# Patient Record
Sex: FEMALE | Race: WHITE | NOT HISPANIC OR LATINO | Employment: OTHER | URBAN - METROPOLITAN AREA
[De-identification: names, ages, dates, MRNs, and addresses within clinical notes are randomized per-mention and may not be internally consistent; named-entity substitution may affect disease eponyms.]

---

## 2024-01-26 ENCOUNTER — OFFICE VISIT (OUTPATIENT)
Dept: ORTHOPEDIC SURGERY | Facility: CLINIC | Age: 69
End: 2024-01-26
Payer: MEDICARE

## 2024-01-26 VITALS
BODY MASS INDEX: 35.41 KG/M2 | WEIGHT: 225.6 LBS | HEIGHT: 67 IN | DIASTOLIC BLOOD PRESSURE: 80 MMHG | SYSTOLIC BLOOD PRESSURE: 140 MMHG

## 2024-01-26 DIAGNOSIS — M17.12 PRIMARY OSTEOARTHRITIS OF LEFT KNEE: ICD-10-CM

## 2024-01-26 DIAGNOSIS — M25.562 LEFT KNEE PAIN, UNSPECIFIED CHRONICITY: Primary | ICD-10-CM

## 2024-01-26 RX ORDER — CYCLOBENZAPRINE HCL 5 MG
TABLET ORAL
COMMUNITY

## 2024-01-26 RX ORDER — OMEPRAZOLE 40 MG/1
CAPSULE, DELAYED RELEASE ORAL
COMMUNITY
Start: 2023-11-28

## 2024-01-26 RX ORDER — FLUTICASONE PROPIONATE AND SALMETEROL XINAFOATE 115; 21 UG/1; UG/1
AEROSOL, METERED RESPIRATORY (INHALATION)
COMMUNITY
Start: 2023-10-07

## 2024-01-26 RX ORDER — CLONAZEPAM 1 MG/1
TABLET ORAL
COMMUNITY
Start: 2023-11-18

## 2024-01-26 RX ORDER — MONTELUKAST SODIUM 10 MG/1
TABLET ORAL
COMMUNITY
Start: 2023-12-08

## 2024-01-26 RX ORDER — TIOTROPIUM BROMIDE INHALATION SPRAY 1.56 UG/1
SPRAY, METERED RESPIRATORY (INHALATION)
COMMUNITY
Start: 2023-11-13

## 2024-01-26 RX ORDER — PREGABALIN 50 MG/1
CAPSULE ORAL
COMMUNITY
Start: 2023-12-15

## 2024-01-26 RX ORDER — DILTIAZEM HYDROCHLORIDE 240 MG/1
CAPSULE, COATED, EXTENDED RELEASE ORAL
COMMUNITY
Start: 2024-01-22

## 2024-01-26 RX ORDER — BUPIVACAINE HYDROCHLORIDE 5 MG/ML
4 INJECTION, SOLUTION EPIDURAL; INTRACAUDAL
Status: COMPLETED | OUTPATIENT
Start: 2024-01-26 | End: 2024-01-26

## 2024-01-26 RX ORDER — METFORMIN HYDROCHLORIDE 500 MG/1
TABLET, EXTENDED RELEASE ORAL
COMMUNITY
Start: 2024-01-15

## 2024-01-26 RX ORDER — ESTRADIOL 0.1 MG/G
CREAM VAGINAL
COMMUNITY

## 2024-01-26 RX ORDER — TRIAMCINOLONE ACETONIDE 40 MG/ML
40 INJECTION, SUSPENSION INTRA-ARTICULAR; INTRAMUSCULAR
Status: COMPLETED | OUTPATIENT
Start: 2024-01-26 | End: 2024-01-26

## 2024-01-26 RX ORDER — FLUTICASONE PROPIONATE 50 MCG
2 SPRAY, SUSPENSION (ML) NASAL DAILY
COMMUNITY

## 2024-01-26 RX ORDER — LIDOCAINE HYDROCHLORIDE 10 MG/ML
4 INJECTION, SOLUTION EPIDURAL; INFILTRATION; INTRACAUDAL; PERINEURAL
Status: COMPLETED | OUTPATIENT
Start: 2024-01-26 | End: 2024-01-26

## 2024-01-26 RX ORDER — LOSARTAN POTASSIUM 100 MG/1
TABLET ORAL
COMMUNITY
Start: 2024-01-17

## 2024-01-26 RX ORDER — EPINEPHRINE 0.3 MG/.3ML
INJECTION SUBCUTANEOUS
COMMUNITY

## 2024-01-26 RX ORDER — ALBUTEROL SULFATE 90 UG/1
AEROSOL, METERED RESPIRATORY (INHALATION)
COMMUNITY

## 2024-01-26 RX ORDER — DUPILUMAB 300 MG/2ML
INJECTION, SOLUTION SUBCUTANEOUS
COMMUNITY
Start: 2024-01-01

## 2024-01-26 RX ORDER — GABAPENTIN 600 MG/1
TABLET ORAL
COMMUNITY
Start: 2023-11-01

## 2024-01-26 RX ORDER — CLONAZEPAM 1 MG/1
TABLET ORAL AS NEEDED
COMMUNITY
Start: 2024-01-24

## 2024-01-26 RX ORDER — LANOLIN ALCOHOL/MO/W.PET/CERES
325 CREAM (GRAM) TOPICAL
COMMUNITY
Start: 2023-12-18

## 2024-01-26 RX ADMIN — TRIAMCINOLONE ACETONIDE 40 MG: 40 INJECTION, SUSPENSION INTRA-ARTICULAR; INTRAMUSCULAR at 10:20

## 2024-01-26 RX ADMIN — LIDOCAINE HYDROCHLORIDE 4 ML: 10 INJECTION, SOLUTION EPIDURAL; INFILTRATION; INTRACAUDAL; PERINEURAL at 10:20

## 2024-01-26 RX ADMIN — BUPIVACAINE HYDROCHLORIDE 4 ML: 5 INJECTION, SOLUTION EPIDURAL; INTRACAUDAL at 10:20

## 2024-01-26 NOTE — PROGRESS NOTES
Procedure   - Large Joint Arthrocentesis: L knee on 1/26/2024 10:20 AM  Indications: pain  Details: 21 G needle, anterolateral approach  Medications: 40 mg triamcinolone acetonide 40 MG/ML; 4 mL lidocaine PF 1% 1 %; 4 mL bupivacaine (PF) 0.5 %  Outcome: tolerated well, no immediate complications  Procedure, treatment alternatives, risks and benefits explained, specific risks discussed. Consent was given by the patient. Immediately prior to procedure a time out was called to verify the correct patient, procedure, equipment, support staff and site/side marked as required. Patient was prepped and draped in the usual sterile fashion.

## 2024-01-26 NOTE — PROGRESS NOTES
[facility] Note        Subjective     Pain of the Left Knee      HPI    Yanira Bernal is a 68 y.o. female.  She has a history of getting previous cortisone injections in her left knee.  She had surgery in 2019 in [place].  She has had 2 right knee surgeries and the one left knee arthroscopy.  She had a partial medial meniscectomy she brings notes from [facility] in [place]    Past Medical History:   Diagnosis Date    Ankle sprain     Arthritis of back     Knee swelling     Lumbosacral disc disease     Rotator cuff syndrome     Tear of meniscus of knee       Past Surgical History:   Procedure Laterality Date    FOOT SURGERY  R baby toe- Hammer toe    KNEE SURGERY  R knee &. L knee 2018    SHOULDER SURGERY  &&2017    TRIGGER POINT INJECTION  L middle finger    Surgery schedueld o 26      Family History   Problem Relation Age of Onset    Cancer Mother         Lung cancer   Age 59    Cancer Father         Eosophigeal cancer  age 66    Diabetes Father         Type 2    Cancer Sister         T cell Lymphoma    Diabetes Brother         Type 2     Social History     Socioeconomic History    Marital status:    Tobacco Use    Smoking status: Former     Packs/day: 1.50     Years: 10.00     Additional pack years: 0.00     Total pack years: 15.00     Types: Cigarettes     Start date: 1973     Quit date: 1983     Years since quittin.0   Substance and Sexual Activity    Alcohol use: Yes     Alcohol/week: 5.0 standard drinks of alcohol     Types: 5 Drinks containing 0.5 oz of alcohol per week    Drug use: Never      Current Outpatient Medications on File Prior to Visit   Medication Sig Dispense Refill    Advair -21 MCG/ACT inhaler       albuterol sulfate HFA (ProAir HFA) 108 (90 Base) MCG/ACT inhaler       clonazePAM (KlonoPIN) 1 MG tablet As Needed.      clonazePAM (KlonoPIN) 1 MG tablet clonazePAM 1 MG Oral Tablet QTY:  30  Days: 30 Refills: 0  Written: 11/18/23 Patient Instructions:      cyclobenzaprine (FLEXERIL) 5 MG tablet Take 1 tablet by oral route at bedtime for 30 days.      dilTIAZem CD (CARDIZEM CD) 240 MG 24 hr capsule       Dupixent 300 MG/2ML solution pen-injector       EPINEPHrine (EPIPEN) 0.3 MG/0.3ML solution auto-injector injection       estradiol (ESTRACE) 0.1 MG/GM vaginal cream       ferrous sulfate 325 (65 FE) MG EC tablet Take 1 tablet by mouth.      fluticasone (FLONASE) 50 MCG/ACT nasal spray 2 sprays into the nostril(s) as directed by provider Daily.      gabapentin (NEURONTIN) 600 MG tablet       losartan (COZAAR) 100 MG tablet       metFORMIN ER (GLUCOPHAGE-XR) 500 MG 24 hr tablet       montelukast (SINGULAIR) 10 MG tablet       omeprazole (priLOSEC) 40 MG capsule       pregabalin (LYRICA) 50 MG capsule       Spiriva Respimat 1.25 MCG/ACT aerosol solution inhaler       Tiotropium Bromide Monohydrate (SPIRIVA RESPIMAT) 1.25 MCG/ACT aerosol solution inhaler        No current facility-administered medications on file prior to visit.      Allergies   Allergen Reactions    Amlodipine Swelling    Adhesive Tape Rash    Dulaglutide Other (See Comments)     Nausea    Other Other (See Comments)     Hydrochlorothiazide  elevated patient  calcium to where she was in the hospital for five days          Review of Systems   Constitutional: Negative.    HENT: Negative.     Eyes: Negative.    Respiratory: Negative.     Cardiovascular: Negative.    Gastrointestinal: Negative.    Endocrine: Negative.    Genitourinary: Negative.    Musculoskeletal:  Positive for arthralgias.   Skin: Negative.    Allergic/Immunologic: Negative.    Neurological: Negative.    Hematological: Negative.    Psychiatric/Behavioral: Negative.          I reviewed the patient's chief complaint, history of present illness, review of systems, past medical history, surgical history, family history, social history, medications and allergy list.       "  Objective      Physical Exam  /80   Ht 170.2 cm (67\")   Wt 102 kg (225 lb 9.6 oz)   BMI 35.33 kg/m²     Body mass index is 35.33 kg/m².    General  Mental Status - alert  General Appearance - cooperative, well groomed, not in acute distress  Orientation - Oriented X3  Build & Nutrition - well developed and well nourished  Posture - normal posture  Gait - normal gait       Ortho Exam  Left knee is tender medial joint line positive Ennis.  Stable ligaments.  Range of motion 5 to 120 degrees.    Imaging/Studies  Imaging Results (Last 24 Hours)       Procedure Component Value Units Date/Time    XR Knee 4+ View Left [[id number]] Resulted: 01/26/24 1019     Updated: 01/26/24 1019    Narrative:      Knee X-Ray  Indication: Pain    Upright AP of bilateral knees. Lateral, skiers and Sunrise views of left   knee     Findings:  No fracture  Kellgren-Cristhian grade 3 left knee less joint space narrowing in the   right knee    No prior studies were available for comparison.              Assessment    Assessment:  1. Left knee pain, unspecified chronicity    2. Primary osteoarthritis of left knee        Plan:  Continue over-the-counter medication as needed for discomfort  I injected her left knee with cortisone.   I discussed with the patient the potential benefits of performing a therapeutic injection of the cortisone as well as potential risks including but not limited to infection, swelling, pain, bleeding, bruising, nerve/vessel damage, skin color changes, transient elevation in blood glucose levels, and fat atrophy. After informed consent and verifying correct patient, procedure site, and type of procedure, the area was prepped with Hibiclens, ethyl chloride was used to numb the skin. The patient tolerated the procedure well. There were no complications.  She will follow-up as needed        Carlos Crisostomo MD  01/26/24  10:20 EST      Dictated Utilizing Dragon Dictation.    "

## 2024-03-13 ENCOUNTER — OFFICE VISIT (OUTPATIENT)
Dept: NEUROSURGERY | Facility: CLINIC | Age: 69
End: 2024-03-13
Payer: MEDICARE

## 2024-03-13 VITALS — BODY MASS INDEX: 36.1 KG/M2 | HEIGHT: 67 IN | WEIGHT: 230 LBS | TEMPERATURE: 98 F | RESPIRATION RATE: 18 BRPM

## 2024-03-13 DIAGNOSIS — G89.29 CHRONIC BILATERAL LOW BACK PAIN WITHOUT SCIATICA: ICD-10-CM

## 2024-03-13 DIAGNOSIS — M47.819 FACET ARTHROPATHY, MULTILEVEL: ICD-10-CM

## 2024-03-13 DIAGNOSIS — M54.50 CHRONIC BILATERAL LOW BACK PAIN WITHOUT SCIATICA: ICD-10-CM

## 2024-03-13 DIAGNOSIS — M48.061 DEGENERATIVE LUMBAR SPINAL STENOSIS: Primary | ICD-10-CM

## 2024-03-13 DIAGNOSIS — M48.062 SPINAL STENOSIS, LUMBAR REGION, WITH NEUROGENIC CLAUDICATION: ICD-10-CM

## 2024-03-13 PROCEDURE — 99203 OFFICE O/P NEW LOW 30 MIN: CPT | Performed by: NEUROLOGICAL SURGERY

## 2024-03-13 NOTE — PROGRESS NOTES
Patient: Yanira Bernal  : 1955    Primary Care Provider: Leticia Menchaca MD    Requesting Provider: As above        History    Chief Complaint: Low back and lower extremity pain with walking and standing intolerance.    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  She has had intermittent pain in her back.  She has developed progressive pain in her back that extends into her legs.  Symptoms occur with standing or walking for more than 20 minutes.  She gets relief by sitting, lying down, or leaning forward at the waist.  She has undergone 2 epidural injections and facet blocks at the [facility].  These have not helped.  She has been treated with gabapentin.  She has done physical therapy.  She does have tingling in her legs that sometimes will awaken her at night.  I have recently operated on her  and he is doing well.  She does have a significant right Achilles tendinopathy.  She has been seen by neurosurgery at the [facility] but at this point would like me to care for her.    Review of Systems   Constitutional:  Negative for activity change, appetite change, chills, diaphoresis, fatigue, fever and unexpected weight change.   HENT:  Negative for congestion, dental problem, drooling, ear discharge, ear pain, facial swelling, hearing loss, mouth sores, nosebleeds, postnasal drip, rhinorrhea, sinus pressure, sneezing, sore throat, tinnitus, trouble swallowing and voice change.    Eyes:  Negative for photophobia, pain, discharge, redness, itching and visual disturbance.   Respiratory:  Negative for apnea, cough, choking, chest tightness, shortness of breath, wheezing and stridor.    Cardiovascular:  Negative for chest pain, palpitations and leg swelling.   Gastrointestinal:  Negative for abdominal distention, abdominal pain, anal bleeding, blood in stool, constipation, diarrhea, nausea, rectal pain and vomiting.   Endocrine:  "Negative for cold intolerance, heat intolerance, polydipsia, polyphagia and polyuria.   Genitourinary:  Negative for decreased urine volume, difficulty urinating, dysuria, enuresis, flank pain, frequency, genital sores, hematuria and urgency.   Musculoskeletal:  Positive for arthralgias and back pain. Negative for gait problem, joint swelling, myalgias, neck pain and neck stiffness.   Skin:  Negative for color change, pallor, rash and wound.   Allergic/Immunologic: Negative for environmental allergies, food allergies and immunocompromised state.   Neurological:  Negative for dizziness, tremors, seizures, syncope, facial asymmetry, speech difficulty, weakness, light-headedness, numbness and headaches.   Hematological:  Negative for adenopathy. Does not bruise/bleed easily.   Psychiatric/Behavioral:  Negative for agitation, behavioral problems, confusion, decreased concentration, dysphoric mood, hallucinations, self-injury, sleep disturbance and suicidal ideas. The patient is not nervous/anxious and is not hyperactive.    All other systems reviewed and are negative.      The patient's past medical history, past surgical history, family history, and social history have been reviewed at length in the electronic medical record.      Physical Exam:   Temp 98 °F (36.7 °C) (Infrared)   Resp 18   Ht 170.2 cm (67\")   Wt 104 kg (230 lb)   BMI 36.02 kg/m²   CONSTITUTIONAL: Patient is well-nourished, pleasant and appears stated age.  MUSCULOSKELETAL:  Straight leg raising is negative.  Aramis's Sign is negative.  ROM in the low back is normal.  Tenderness in the back to palpation is not observed.  NEUROLOGICAL:  Orientation, memory, attention span, language function, and cognition have been examined and are intact.  Strength is intact in the lower extremities to direct testing.  Muscle tone is normal throughout.  Station and gait are normal.  Sensation is intact to light touch testing throughout.  Deep tendon reflexes are " difficult to elicit throughout.  Coordination is intact.      Medical Decision Making    Data Review:   (All imaging studies were personally reviewed unless stated otherwise)  MRI of the lumbar spine dated 2/15/2024 demonstrates diffuse degenerative disc disease and facet arthropathy.  There may be a very subtle offset of L4 on L5.  There is generous grade stenosis at L4-5.    Diagnosis:   Lumbar stenosis with neurogenic claudication.    Treatment Options:   For completeness, I am going to check flexion-extension upright lateral lumbar spine x-rays to assess for instability.  I think she will likely benefit from a decompression.      Scribed for Dheeraj Wellington MD by Kar Cueva CMA. 3/13/2024 15:06 EDT      I, Dr. Wellington, personally performed the services described in the documentation, as scribed in my presence, and it is both accurate and complete.

## 2024-03-15 ENCOUNTER — HOSPITAL ENCOUNTER (OUTPATIENT)
Dept: GENERAL RADIOLOGY | Facility: HOSPITAL | Age: 69
Discharge: HOME | End: 2024-03-15
Payer: MEDICARE

## 2024-03-15 DIAGNOSIS — M48.062 SPINAL STENOSIS, LUMBAR REGION, WITH NEUROGENIC CLAUDICATION: ICD-10-CM

## 2024-03-15 PROCEDURE — 72120 X-RAY BEND ONLY L-S SPINE: CPT

## 2024-03-20 ENCOUNTER — PREP FOR SURGERY (OUTPATIENT)
Dept: OTHER | Facility: HOSPITAL | Age: 69
End: 2024-03-20
Payer: MEDICARE

## 2024-03-20 ENCOUNTER — OFFICE VISIT (OUTPATIENT)
Dept: NEUROSURGERY | Facility: CLINIC | Age: 69
End: 2024-03-20
Payer: MEDICARE

## 2024-03-20 VITALS — BODY MASS INDEX: 35.47 KG/M2 | HEIGHT: 67 IN | TEMPERATURE: 97.3 F | WEIGHT: 226 LBS

## 2024-03-20 DIAGNOSIS — G89.29 CHRONIC BILATERAL LOW BACK PAIN WITHOUT SCIATICA: ICD-10-CM

## 2024-03-20 DIAGNOSIS — M48.061 DEGENERATIVE LUMBAR SPINAL STENOSIS: Primary | ICD-10-CM

## 2024-03-20 DIAGNOSIS — M54.50 CHRONIC BILATERAL LOW BACK PAIN WITHOUT SCIATICA: ICD-10-CM

## 2024-03-20 DIAGNOSIS — M48.062 LUMBAR STENOSIS WITH NEUROGENIC CLAUDICATION: Primary | ICD-10-CM

## 2024-03-20 DIAGNOSIS — M47.819 FACET ARTHROPATHY, MULTILEVEL: ICD-10-CM

## 2024-03-20 DIAGNOSIS — M48.062 SPINAL STENOSIS, LUMBAR REGION, WITH NEUROGENIC CLAUDICATION: ICD-10-CM

## 2024-03-20 PROCEDURE — 1159F MED LIST DOCD IN RCRD: CPT | Performed by: NEUROLOGICAL SURGERY

## 2024-03-20 PROCEDURE — 99214 OFFICE O/P EST MOD 30 MIN: CPT | Performed by: NEUROLOGICAL SURGERY

## 2024-03-20 PROCEDURE — 1160F RVW MEDS BY RX/DR IN RCRD: CPT | Performed by: NEUROLOGICAL SURGERY

## 2024-03-20 RX ORDER — FAMOTIDINE 20 MG/1
20 TABLET, FILM COATED ORAL
OUTPATIENT
Start: 2024-03-20

## 2024-03-20 RX ORDER — IBUPROFEN 800 MG/1
800 TABLET ORAL ONCE
OUTPATIENT
Start: 2024-03-20 | End: 2024-03-20

## 2024-03-20 RX ORDER — OXYCODONE HCL 10 MG/1
10 TABLET, FILM COATED, EXTENDED RELEASE ORAL ONCE
OUTPATIENT
Start: 2024-03-20 | End: 2024-03-20

## 2024-03-20 RX ORDER — ACETAMINOPHEN 500 MG
1000 TABLET ORAL ONCE
OUTPATIENT
Start: 2024-03-20 | End: 2024-03-20

## 2024-03-20 RX ORDER — CHLORHEXIDINE GLUCONATE 4 G/100ML
SOLUTION TOPICAL
Qty: 120 ML | Refills: 0 | Status: SHIPPED | OUTPATIENT
Start: 2024-03-20

## 2024-03-20 NOTE — PROGRESS NOTES
Patient: Yanira Bernal  : 1955    Primary Care Provider: Leticia Menchaca MD    Requesting Provider: As above        History    Chief Complaint: Low back and lower extremity pain with walking and standing intolerance.    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  She has had intermittent pain in her back.  She has developed progressive pain in her back that extends into her legs.  Symptoms occur with standing or walking for more than 20 minutes.  She gets relief by sitting, lying down, or leaning forward at the waist.  She has undergone 2 epidural injections and facet blocks at the [facility].  These have not helped.  She has been treated with gabapentin.  She has done physical therapy.  She does have tingling in her legs that sometimes will awaken her at night.  I have recently operated on her  and he is doing well.  She does have a significant right Achilles tendinopathy.  She has been seen by neurosurgery at the [facility] but at this point would like me to care for her.  She reports no changes in her symptoms.       Review of Systems   Constitutional:  Positive for activity change and fatigue. Negative for appetite change, chills, diaphoresis, fever and unexpected weight change.   HENT:  Positive for hearing loss and tinnitus. Negative for congestion, dental problem, drooling, ear discharge, ear pain, facial swelling, mouth sores, nosebleeds, postnasal drip, rhinorrhea, sinus pressure, sinus pain, sneezing, sore throat, trouble swallowing and voice change.    Eyes:  Negative for photophobia, pain, discharge, redness, itching and visual disturbance.   Respiratory:  Positive for apnea. Negative for cough, choking, chest tightness, shortness of breath, wheezing and stridor.    Cardiovascular:  Negative for chest pain, palpitations and leg swelling.   Gastrointestinal:  Negative for abdominal distention, abdominal pain,  "anal bleeding, blood in stool, constipation, diarrhea, nausea, rectal pain and vomiting.   Endocrine: Negative for cold intolerance, heat intolerance, polydipsia, polyphagia and polyuria.   Genitourinary:  Negative for decreased urine volume, difficulty urinating, dyspareunia, dysuria, enuresis, flank pain, frequency, genital sores, hematuria, menstrual problem, pelvic pain, urgency, vaginal bleeding, vaginal discharge and vaginal pain.   Musculoskeletal:  Positive for arthralgias and back pain. Negative for gait problem, joint swelling, myalgias, neck pain and neck stiffness.   Skin:  Negative for color change, pallor, rash and wound.   Allergic/Immunologic: Negative for environmental allergies, food allergies and immunocompromised state.   Neurological:  Negative for dizziness, tremors, seizures, syncope, facial asymmetry, speech difficulty, weakness, light-headedness, numbness and headaches.   Hematological:  Negative for adenopathy. Does not bruise/bleed easily.   Psychiatric/Behavioral:  Positive for sleep disturbance. Negative for agitation, behavioral problems, confusion, decreased concentration, dysphoric mood, hallucinations, self-injury and suicidal ideas. The patient is nervous/anxious. The patient is not hyperactive.      The patient's past medical history, past surgical history, family history, and social history have been reviewed at length in the electronic medical record.      Physical Exam:   Temp 97.3 °F (36.3 °C)   Ht 170.2 cm (67.01\")   Wt 103 kg (226 lb)   BMI 35.39 kg/m²   MUSCULOSKELETAL:  Straight leg raising is negative.  Aramis's Sign is negative.  ROM in the low back is normal.  Tenderness in the back to palpation is not observed.  NEUROLOGICAL:  Strength is intact in the lower extremities to direct testing.  Muscle tone is normal throughout.  Station and gait are normal.  Sensation is intact to light touch testing throughout.      Medical Decision Making    Data Review:   (All imaging " studies were personally reviewed unless stated otherwise)  MRI of the lumbar spine dated 2/15/2024 demonstrates diffuse degenerative disc disease and facet arthropathy. There may be a very subtle offset of L4 on L5. There is generous grade stenosis at L4-5.     Plain flexion-extension films do not demonstrate any evidence of instability.  There is a very subtle listhesis of L4 and L5.    Diagnosis:   Lumbar stenosis with neurogenic claudication.    Treatment Options:   I have recommended decompressive laminectomy at L4-5.  The nature of the procedure as well as the potential risks, complications, limitations, and alternatives to the procedure were discussed at length with the patient and the patient has agreed to proceed with surgery.  The patient reports that she was given Norco after her shoulder procedure and that was not helpful.  She reports that she has a very low pain threshold.      Scribed for Dheeraj Wellington MD by Briseida Johansen MA on 3/20/2024 14:22 EDT       I, Dr. Wellington, personally performed the services described in the documentation, as scribed in my presence, and it is both accurate and complete.

## 2024-03-20 NOTE — H&P
Patient: Yanira Bernal  : 1955     Primary Care Provider: Leticia Menchaca MD     Requesting Provider: As above           History     Chief Complaint: Low back and lower extremity pain with walking and standing intolerance.     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  She has had intermittent pain in her back.  She has developed progressive pain in her back that extends into her legs.  Symptoms occur with standing or walking for more than 20 minutes.  She gets relief by sitting, lying down, or leaning forward at the waist.  She has undergone 2 epidural injections and facet blocks at the [facility].  These have not helped.  She has been treated with gabapentin.  She has done physical therapy.  She does have tingling in her legs that sometimes will awaken her at night.  I have recently operated on her  and he is doing well.  She does have a significant right Achilles tendinopathy.  She has been seen by neurosurgery at the [facility] but at this point would like me to care for her.  She reports no changes in her symptoms.        Review of Systems   Constitutional:  Positive for activity change and fatigue. Negative for appetite change, chills, diaphoresis, fever and unexpected weight change.   HENT:  Positive for hearing loss and tinnitus. Negative for congestion, dental problem, drooling, ear discharge, ear pain, facial swelling, mouth sores, nosebleeds, postnasal drip, rhinorrhea, sinus pressure, sinus pain, sneezing, sore throat, trouble swallowing and voice change.    Eyes:  Negative for photophobia, pain, discharge, redness, itching and visual disturbance.   Respiratory:  Positive for apnea. Negative for cough, choking, chest tightness, shortness of breath, wheezing and stridor.    Cardiovascular:  Negative for chest pain, palpitations and leg swelling.   Gastrointestinal:  Negative for abdominal distention,  abdominal pain, anal bleeding, blood in stool, constipation, diarrhea, nausea, rectal pain and vomiting.   Endocrine: Negative for cold intolerance, heat intolerance, polydipsia, polyphagia and polyuria.   Genitourinary:  Negative for decreased urine volume, difficulty urinating, dyspareunia, dysuria, enuresis, flank pain, frequency, genital sores, hematuria, menstrual problem, pelvic pain, urgency, vaginal bleeding, vaginal discharge and vaginal pain.   Musculoskeletal:  Positive for arthralgias and back pain. Negative for gait problem, joint swelling, myalgias, neck pain and neck stiffness.   Skin:  Negative for color change, pallor, rash and wound.   Allergic/Immunologic: Negative for environmental allergies, food allergies and immunocompromised state.   Neurological:  Negative for dizziness, tremors, seizures, syncope, facial asymmetry, speech difficulty, weakness, light-headedness, numbness and headaches.   Hematological:  Negative for adenopathy. Does not bruise/bleed easily.   Psychiatric/Behavioral:  Positive for sleep disturbance. Negative for agitation, behavioral problems, confusion, decreased concentration, dysphoric mood, hallucinations, self-injury and suicidal ideas. The patient is nervous/anxious. The patient is not hyperactive.       The patient's past medical history, past surgical history, family history, and social history have been reviewed at length in the electronic medical record.     Past Medical History:   Diagnosis Date    Abnormal ECG 2012    R side bundle branch block    Ankle sprain 1999    Arthritis of back     Asthma 2012    Cancer 2005    Squarmous cell skin cancer    Diabetes mellitus 2020    Hyperlipidemia 2010    Hypertension 2002    Knee swelling     Liver disease 2020    [facility]    Low back pain 1979    Lumbosacral disc disease 1979    MRSA (methicillin resistant Staphylococcus aureus) 2018    Peripheral neuropathy 1991    Rotator cuff syndrome 2015    Tear of meniscus of knee 2018      Past Surgical History:   Procedure Laterality Date    EPIDURAL BLOCK  [id number]    Childbirth    FOOT SURGERY  R baby toe- Hammer toe    KNEE SURGERY  R knee &. L knee 2018    SHOULDER SURGERY  &&2017    TRIGGER POINT INJECTION  L middle finger    Surgery schedueld o 24     Family History   Problem Relation Age of Onset    Cancer Mother         Lung cancer   Age 59    Early death Mother     Thyroid disease Mother     Cancer Father         Eosophigeal cancer  age 66    Diabetes Father         Type 2    Alcohol abuse Father     Hyperlipidemia Father     Cancer Sister         T cell Lymphoma    Alcohol abuse Sister     Depression Sister     Diabetes Brother         Type 2    Asthma Brother      Social History     Socioeconomic History    Marital status:    Tobacco Use    Smoking status: Former     Current packs/day: 0.00     Average packs/day: 1.5 packs/day for 10.0 years (15.0 ttl pk-yrs)     Types: Cigarettes     Start date: 1973     Quit date: 1983     Years since quittin.1   Substance and Sexual Activity    Alcohol use: Yes     Alcohol/week: 8.0 standard drinks of alcohol     Types: 3 Glasses of wine, 5 Drinks containing 0.5 oz of alcohol per week    Drug use: Never    Sexual activity: Not Currently     Partners: Male     Birth control/protection: Post-menopausal           Allergies   Allergen Reactions    Amlodipine Swelling    Adhesive Tape Rash    Dulaglutide Other (See Comments)     Nausea    Other Other (See Comments)     Hydrochlorothiazide  elevated patient  calcium to where she was in the hospital for five days       Current Outpatient Medications on File Prior to Visit   Medication Sig Dispense Refill    Advair -21 MCG/ACT inhaler       albuterol sulfate HFA (ProAir HFA) 108 (90 Base) MCG/ACT inhaler       clonazePAM (KlonoPIN) 1 MG tablet As Needed.      clonazePAM (KlonoPIN) 1 MG tablet clonazePAM 1 MG Oral Tablet QTY: 30  Days: 30 Refills: 0  Written:  "11/18/23 Patient Instructions:      cyclobenzaprine (FLEXERIL) 5 MG tablet Take 1 tablet by oral route at bedtime for 30 days.      dilTIAZem CD (CARDIZEM CD) 240 MG 24 hr capsule       Dupixent 300 MG/2ML solution pen-injector       EPINEPHrine (EPIPEN) 0.3 MG/0.3ML solution auto-injector injection       estradiol (ESTRACE) 0.1 MG/GM vaginal cream       ferrous sulfate 325 (65 FE) MG EC tablet Take 1 tablet by mouth.      fluticasone (FLONASE) 50 MCG/ACT nasal spray 2 sprays into the nostril(s) as directed by provider Daily.      gabapentin (NEURONTIN) 600 MG tablet       losartan (COZAAR) 100 MG tablet       metFORMIN ER (GLUCOPHAGE-XR) 500 MG 24 hr tablet       montelukast (SINGULAIR) 10 MG tablet       omeprazole (priLOSEC) 40 MG capsule       Spiriva Respimat 1.25 MCG/ACT aerosol solution inhaler       Tiotropium Bromide Monohydrate (SPIRIVA RESPIMAT) 1.25 MCG/ACT aerosol solution inhaler        No current facility-administered medications on file prior to visit.          Physical Exam:   Temp 97.3 °F (36.3 °C)   Ht 170.2 cm (67.01\")   Wt 103 kg (226 lb)   BMI 35.39 kg/m²   MUSCULOSKELETAL:  Straight leg raising is negative.  Aramis's Sign is negative.  ROM in the low back is normal.  Tenderness in the back to palpation is not observed.  NEUROLOGICAL:  Strength is intact in the lower extremities to direct testing.  Muscle tone is normal throughout.  Station and gait are normal.  Sensation is intact to light touch testing throughout.        Medical Decision Making     Data Review:   (All imaging studies were personally reviewed unless stated otherwise)  MRI of the lumbar spine dated 2/15/2024 demonstrates diffuse degenerative disc disease and facet arthropathy. There may be a very subtle offset of L4 on L5. There is generous grade stenosis at L4-5.      Plain flexion-extension films do not demonstrate any evidence of instability.  There is a very subtle listhesis of L4 and L5.     Diagnosis:   Lumbar " stenosis with neurogenic claudication.     Treatment Options:   I have recommended decompressive laminectomy at L4-5.  The nature of the procedure as well as the potential risks, complications, limitations, and alternatives to the procedure were discussed at length with the patient and the patient has agreed to proceed with surgery.  The patient reports that she was given Norco after her shoulder procedure and that was not helpful.  She reports that she has a very low pain threshold.

## 2024-04-18 ENCOUNTER — PRE-ADMISSION TESTING (OUTPATIENT)
Dept: PREADMISSION TESTING | Facility: HOSPITAL | Age: 69
End: 2024-04-18
Payer: MEDICARE

## 2024-04-18 VITALS — WEIGHT: 229.17 LBS | HEIGHT: 67 IN | BODY MASS INDEX: 35.97 KG/M2

## 2024-04-18 DIAGNOSIS — M48.062 LUMBAR STENOSIS WITH NEUROGENIC CLAUDICATION: ICD-10-CM

## 2024-04-18 LAB
DEPRECATED RDW RBC AUTO: 43.2 FL (ref 37–54)
ERYTHROCYTE [DISTWIDTH] IN BLOOD BY AUTOMATED COUNT: 12.5 % (ref 12.3–15.4)
HBA1C MFR BLD: 6.2 % (ref 4.8–5.6)
HCT VFR BLD AUTO: 41.4 % (ref 34–46.6)
HGB BLD-MCNC: 13.6 G/DL (ref 12–15.9)
MCH RBC QN AUTO: 30.8 PG (ref 26.6–33)
MCHC RBC AUTO-ENTMCNC: 32.9 G/DL (ref 31.5–35.7)
MCV RBC AUTO: 93.7 FL (ref 79–97)
PLATELET # BLD AUTO: 270 10*3/MM3 (ref 140–450)
PMV BLD AUTO: 9.9 FL (ref 6–12)
POTASSIUM SERPL-SCNC: 4.2 MMOL/L (ref 3.5–5.2)
QT INTERVAL: 404 MS
QTC INTERVAL: 429 MS
RBC # BLD AUTO: 4.42 10*6/MM3 (ref 3.77–5.28)
WBC NRBC COR # BLD AUTO: 7.11 10*3/MM3 (ref 3.4–10.8)

## 2024-04-18 PROCEDURE — 84132 ASSAY OF SERUM POTASSIUM: CPT

## 2024-04-18 PROCEDURE — 93010 ELECTROCARDIOGRAM REPORT: CPT | Performed by: INTERNAL MEDICINE

## 2024-04-18 PROCEDURE — 36415 COLL VENOUS BLD VENIPUNCTURE: CPT

## 2024-04-18 PROCEDURE — 93005 ELECTROCARDIOGRAM TRACING: CPT

## 2024-04-18 PROCEDURE — 85027 COMPLETE CBC AUTOMATED: CPT

## 2024-04-18 PROCEDURE — 83036 HEMOGLOBIN GLYCOSYLATED A1C: CPT

## 2024-04-18 PROCEDURE — 87081 CULTURE SCREEN ONLY: CPT

## 2024-04-18 RX ORDER — VITAMIN E 268 MG
800 CAPSULE ORAL DAILY
COMMUNITY

## 2024-04-18 RX ORDER — PRAVASTATIN SODIUM 40 MG
40 TABLET ORAL DAILY
COMMUNITY
Start: 2024-04-15

## 2024-04-18 NOTE — PAT
An arrival time for procedure was not provided during PAT visit. If patient had any questions or concerns about their arrival time, they were instructed to contact their surgeon/physician.  Additionally, if the patient referred to an arrival time that was acquired from their my chart account, patient was encouraged to verify that time with their surgeon/physician. Arrival times are NOT provided in Pre Admission Testing Department.    Patient viewed general PAT education video as instructed in their preoperative information received from their surgeon.  Patient stated the general PAT education video was viewed in its entirety and survey completed.  Copies of PAT general education handouts (Incentive Spirometry, Meds to Beds Program, Patient Belongings, Pre-op skin preparation instructions, Blood Glucose testing, Visitor policy, Surgery FAQ, Code H) distributed to patient if not printed. Education related to the PAT pass and skin preparation for surgery (if applicable) completed in PAT as a reinforcement to PAT education video. Patient instructed to return PAT pass provided today as well as completed skin preparation sheet (if applicable) on the day of procedure.     Additionally if patient had not viewed video yet but intended to view it at home or in our waiting area, then referred them to the handout with QR code/link provided during PAT visit.  Instructed patient to complete survey after viewing the video in its entirety.  Encouraged patient/family to read PAT general education handouts thoroughly and notify PAT staff with any questions or concerns. Patient verbalized understanding of all information and priority content.    Patient denies any current skin issues.     Patient to apply Chlorhexadine wipes  to surgical area (as instructed) the night before procedure and the AM of procedure. Wipes provided.    Bactroban (if prescribed) and Chlorhexidine Prescription prescribed by physician before PAT visit.  Verified  with patient that medication(s) were picked up from their pharmacy.  Written instructions given to patient during PAT visit.  Patient/family also instructed to complete skin prep checklist and return the checklist on the day of surgery to preoperative staff.  Patient/family verbalized understanding.    Patient instructed to drink 20 ounces of Gatorade or Gatorlyte (if diabetic) and it needs to be completed 1 hour (for Main OR patients) or 2 hours (scheduled  section & [facility] patients) before given arrival time for procedure (NO RED Gatorade and NO Gatorade Zero).    Patient verbalized understanding.    Per Anesthesia Request, patient instructed not to take their ACE/ARB medications on the AM of surgery.    Patient instructed to bring CPAP mask and tubing to the hospital for overnight stay.  Explained that it is not necessary to bring their CPAP machine to the hospital instead a CPAP machine will be provided for use by the hospital. If patient knows their CPAP settings, those settings will be implemented.  If not, the CPAP machine will be utilized on the auto setting using their mask and tubing.    Patient verbalized understanding.

## 2024-04-19 LAB — MRSA SPEC QL CULT: NORMAL

## 2024-04-25 ENCOUNTER — APPOINTMENT (OUTPATIENT)
Dept: GENERAL RADIOLOGY | Facility: HOSPITAL | Age: 69
End: 2024-04-25
Payer: MEDICARE

## 2024-04-25 ENCOUNTER — ANESTHESIA EVENT (OUTPATIENT)
Dept: PERIOP | Facility: HOSPITAL | Age: 69
End: 2024-04-25
Payer: MEDICARE

## 2024-04-25 ENCOUNTER — ANESTHESIA (OUTPATIENT)
Dept: PERIOP | Facility: HOSPITAL | Age: 69
End: 2024-04-25
Payer: MEDICARE

## 2024-04-25 ENCOUNTER — HOSPITAL ENCOUNTER (OUTPATIENT)
Facility: HOSPITAL | Age: 69
Discharge: HOME | End: 2024-04-26
Attending: NEUROLOGICAL SURGERY | Admitting: NEUROLOGICAL SURGERY
Payer: MEDICARE

## 2024-04-25 DIAGNOSIS — M48.062 LUMBAR STENOSIS WITH NEUROGENIC CLAUDICATION: ICD-10-CM

## 2024-04-25 LAB
GLUCOSE BLDC GLUCOMTR-MCNC: 110 MG/DL (ref 70–130)
GLUCOSE BLDC GLUCOMTR-MCNC: 132 MG/DL (ref 70–130)

## 2024-04-25 PROCEDURE — 63710000001 PRAVASTATIN 40 MG TABLET: Performed by: NEUROLOGICAL SURGERY

## 2024-04-25 PROCEDURE — 63710000001 FLUTICASONE 50 MCG/ACT SUSPENSION 16 G BOTTLE: Performed by: NEUROLOGICAL SURGERY

## 2024-04-25 PROCEDURE — 63710000001 CLONAZEPAM 1 MG TABLET: Performed by: NEUROLOGICAL SURGERY

## 2024-04-25 PROCEDURE — 63710000001 OXYCODONE 10 MG TABLET EXTENDED-RELEASE 12 HOUR: Performed by: NEUROLOGICAL SURGERY

## 2024-04-25 PROCEDURE — 82948 REAGENT STRIP/BLOOD GLUCOSE: CPT

## 2024-04-25 PROCEDURE — A9270 NON-COVERED ITEM OR SERVICE: HCPCS | Performed by: NEUROLOGICAL SURGERY

## 2024-04-25 PROCEDURE — 25010000002 HYDROMORPHONE 1 MG/ML SOLUTION

## 2024-04-25 PROCEDURE — 25810000003 LACTATED RINGERS PER 1000 ML: Performed by: ANESTHESIOLOGY

## 2024-04-25 PROCEDURE — 63710000001 LOSARTAN 50 MG TABLET: Performed by: NEUROLOGICAL SURGERY

## 2024-04-25 PROCEDURE — 94640 AIRWAY INHALATION TREATMENT: CPT

## 2024-04-25 PROCEDURE — 25010000002 DROPERIDOL PER 5 MG: Performed by: NURSE ANESTHETIST, CERTIFIED REGISTERED

## 2024-04-25 PROCEDURE — 63710000001 MONTELUKAST 10 MG TABLET: Performed by: NEUROLOGICAL SURGERY

## 2024-04-25 PROCEDURE — 63710000001 BUDESONIDE-FORMOTEROL 160-4.5 MCG/ACT AEROSOL 6 G INHALER: Performed by: NEUROLOGICAL SURGERY

## 2024-04-25 PROCEDURE — 94799 UNLISTED PULMONARY SVC/PX: CPT

## 2024-04-25 PROCEDURE — 63710000001 OXYCODONE-ACETAMINOPHEN 7.5-325 MG TABLET: Performed by: NEUROLOGICAL SURGERY

## 2024-04-25 PROCEDURE — 25010000002 SUGAMMADEX 200 MG/2ML SOLUTION

## 2024-04-25 PROCEDURE — 25810000003 LACTATED RINGERS PER 1000 ML: Performed by: NEUROLOGICAL SURGERY

## 2024-04-25 PROCEDURE — 63047 LAM FACETEC & FORAMOT LUMBAR: CPT

## 2024-04-25 PROCEDURE — 63710000001 IBUPROFEN 800 MG TABLET: Performed by: NEUROLOGICAL SURGERY

## 2024-04-25 PROCEDURE — 25810000003 SODIUM CHLORIDE PER 500 ML: Performed by: NEUROLOGICAL SURGERY

## 2024-04-25 PROCEDURE — 76000 FLUOROSCOPY <1 HR PHYS/QHP: CPT

## 2024-04-25 PROCEDURE — 63710000001 METFORMIN ER 500 MG TABLET SUSTAINED-RELEASE 24 HOUR: Performed by: NEUROLOGICAL SURGERY

## 2024-04-25 PROCEDURE — 94664 DEMO&/EVAL PT USE INHALER: CPT

## 2024-04-25 PROCEDURE — 25810000003 LACTATED RINGERS PER 1000 ML: Performed by: NURSE ANESTHETIST, CERTIFIED REGISTERED

## 2024-04-25 PROCEDURE — 25010000002 CEFAZOLIN PER 500 MG: Performed by: NEUROLOGICAL SURGERY

## 2024-04-25 PROCEDURE — 25010000002 DEXAMETHASONE PER 1 MG: Performed by: NURSE ANESTHETIST, CERTIFIED REGISTERED

## 2024-04-25 PROCEDURE — A9270 NON-COVERED ITEM OR SERVICE: HCPCS | Performed by: ANESTHESIOLOGY

## 2024-04-25 PROCEDURE — 63710000001 ACETAMINOPHEN EXTRA STRENGTH 500 MG TABLET: Performed by: NEUROLOGICAL SURGERY

## 2024-04-25 PROCEDURE — 63710000001 FAMOTIDINE 20 MG TABLET: Performed by: ANESTHESIOLOGY

## 2024-04-25 PROCEDURE — 63047 LAM FACETEC & FORAMOT LUMBAR: CPT | Performed by: NEUROLOGICAL SURGERY

## 2024-04-25 PROCEDURE — 25010000002 ONDANSETRON PER 1 MG

## 2024-04-25 PROCEDURE — 63710000001 GABAPENTIN 300 MG CAPSULE: Performed by: NEUROLOGICAL SURGERY

## 2024-04-25 PROCEDURE — 63710000001 IBUPROFEN 600 MG TABLET: Performed by: NEUROLOGICAL SURGERY

## 2024-04-25 PROCEDURE — 25010000002 FENTANYL CITRATE (PF) 50 MCG/ML SOLUTION

## 2024-04-25 PROCEDURE — 25010000002 FENTANYL CITRATE (PF) 100 MCG/2ML SOLUTION: Performed by: NURSE ANESTHETIST, CERTIFIED REGISTERED

## 2024-04-25 PROCEDURE — 25010000002 PROPOFOL 10 MG/ML EMULSION: Performed by: NURSE ANESTHETIST, CERTIFIED REGISTERED

## 2024-04-25 PROCEDURE — 25010000002 MORPHINE PER 10 MG: Performed by: NEUROLOGICAL SURGERY

## 2024-04-25 DEVICE — FLOSEAL WITH RECOTHROM - 10ML.
Type: IMPLANTABLE DEVICE | Site: SPINE LUMBAR | Status: FUNCTIONAL
Brand: FLOSEAL HEMOSTATIC MATRIX

## 2024-04-25 DEVICE — HEMOST ABS SURGIFOAM SZ100 8X12 10MM: Type: IMPLANTABLE DEVICE | Site: SPINE LUMBAR | Status: FUNCTIONAL

## 2024-04-25 RX ORDER — SODIUM CHLORIDE 9 MG/ML
40 INJECTION, SOLUTION INTRAVENOUS AS NEEDED
Status: DISCONTINUED | OUTPATIENT
Start: 2024-04-25 | End: 2024-04-26 | Stop reason: HOSPADM

## 2024-04-25 RX ORDER — LOSARTAN POTASSIUM 50 MG/1
100 TABLET ORAL DAILY
Status: DISCONTINUED | OUTPATIENT
Start: 2024-04-25 | End: 2024-04-26 | Stop reason: HOSPADM

## 2024-04-25 RX ORDER — LIDOCAINE HYDROCHLORIDE 10 MG/ML
0.5 INJECTION, SOLUTION EPIDURAL; INFILTRATION; INTRACAUDAL; PERINEURAL ONCE AS NEEDED
Status: COMPLETED | OUTPATIENT
Start: 2024-04-25 | End: 2024-04-25

## 2024-04-25 RX ORDER — FAMOTIDINE 20 MG/1
20 TABLET, FILM COATED ORAL
Status: DISCONTINUED | OUTPATIENT
Start: 2024-04-25 | End: 2024-04-25

## 2024-04-25 RX ORDER — IBUPROFEN 600 MG/1
600 TABLET ORAL EVERY 8 HOURS
Status: DISCONTINUED | OUTPATIENT
Start: 2024-04-25 | End: 2024-04-26 | Stop reason: HOSPADM

## 2024-04-25 RX ORDER — MORPHINE SULFATE 4 MG/ML
4 INJECTION, SOLUTION INTRAMUSCULAR; INTRAVENOUS EVERY 4 HOURS PRN
Status: DISCONTINUED | OUTPATIENT
Start: 2024-04-25 | End: 2024-04-26 | Stop reason: HOSPADM

## 2024-04-25 RX ORDER — AMOXICILLIN 250 MG
2 CAPSULE ORAL NIGHTLY PRN
Status: DISCONTINUED | OUTPATIENT
Start: 2024-04-25 | End: 2024-04-26 | Stop reason: HOSPADM

## 2024-04-25 RX ORDER — SODIUM CHLORIDE 9 MG/ML
40 INJECTION, SOLUTION INTRAVENOUS AS NEEDED
Status: DISCONTINUED | OUTPATIENT
Start: 2024-04-25 | End: 2024-04-25 | Stop reason: HOSPADM

## 2024-04-25 RX ORDER — PROMETHAZINE HYDROCHLORIDE 25 MG/1
25 SUPPOSITORY RECTAL ONCE AS NEEDED
Status: DISCONTINUED | OUTPATIENT
Start: 2024-04-25 | End: 2024-04-25 | Stop reason: HOSPADM

## 2024-04-25 RX ORDER — PROMETHAZINE HYDROCHLORIDE 12.5 MG/1
12.5 SUPPOSITORY RECTAL EVERY 6 HOURS PRN
Status: DISCONTINUED | OUTPATIENT
Start: 2024-04-25 | End: 2024-04-26 | Stop reason: HOSPADM

## 2024-04-25 RX ORDER — METFORMIN HYDROCHLORIDE 500 MG/1
1500 TABLET, EXTENDED RELEASE ORAL DAILY
Status: DISCONTINUED | OUTPATIENT
Start: 2024-04-25 | End: 2024-04-26 | Stop reason: HOSPADM

## 2024-04-25 RX ORDER — CLONAZEPAM 1 MG/1
1 TABLET ORAL NIGHTLY PRN
Status: DISCONTINUED | OUTPATIENT
Start: 2024-04-25 | End: 2024-04-26 | Stop reason: HOSPADM

## 2024-04-25 RX ORDER — DEXAMETHASONE SODIUM PHOSPHATE 4 MG/ML
INJECTION, SOLUTION INTRA-ARTICULAR; INTRALESIONAL; INTRAMUSCULAR; INTRAVENOUS; SOFT TISSUE AS NEEDED
Status: DISCONTINUED | OUTPATIENT
Start: 2024-04-25 | End: 2024-04-25 | Stop reason: SURG

## 2024-04-25 RX ORDER — SODIUM CHLORIDE, SODIUM LACTATE, POTASSIUM CHLORIDE, CALCIUM CHLORIDE 600; 310; 30; 20 MG/100ML; MG/100ML; MG/100ML; MG/100ML
INJECTION, SOLUTION INTRAVENOUS CONTINUOUS PRN
Status: DISCONTINUED | OUTPATIENT
Start: 2024-04-25 | End: 2024-04-25 | Stop reason: SURG

## 2024-04-25 RX ORDER — FENTANYL CITRATE 50 UG/ML
INJECTION, SOLUTION INTRAMUSCULAR; INTRAVENOUS
Status: COMPLETED
Start: 2024-04-25 | End: 2024-04-25

## 2024-04-25 RX ORDER — DROPERIDOL 2.5 MG/ML
0.62 INJECTION, SOLUTION INTRAMUSCULAR; INTRAVENOUS ONCE AS NEEDED
Status: DISCONTINUED | OUTPATIENT
Start: 2024-04-25 | End: 2024-04-25 | Stop reason: HOSPADM

## 2024-04-25 RX ORDER — CEFAZOLIN SODIUM 2 G/100ML
2 INJECTION, SOLUTION INTRAVENOUS EVERY 8 HOURS
Qty: 200 ML | Refills: 0 | Status: DISCONTINUED | OUTPATIENT
Start: 2024-04-25 | End: 2024-04-25

## 2024-04-25 RX ORDER — FENTANYL CITRATE 50 UG/ML
INJECTION, SOLUTION INTRAMUSCULAR; INTRAVENOUS AS NEEDED
Status: DISCONTINUED | OUTPATIENT
Start: 2024-04-25 | End: 2024-04-25 | Stop reason: SURG

## 2024-04-25 RX ORDER — ONDANSETRON 2 MG/ML
INJECTION INTRAMUSCULAR; INTRAVENOUS AS NEEDED
Status: DISCONTINUED | OUTPATIENT
Start: 2024-04-25 | End: 2024-04-25 | Stop reason: SURG

## 2024-04-25 RX ORDER — FERROUS SULFATE 325(65) MG
325 TABLET ORAL
Status: DISCONTINUED | OUTPATIENT
Start: 2024-04-25 | End: 2024-04-25

## 2024-04-25 RX ORDER — OXYCODONE AND ACETAMINOPHEN 7.5; 325 MG/1; MG/1
1 TABLET ORAL EVERY 4 HOURS PRN
Status: DISCONTINUED | OUTPATIENT
Start: 2024-04-25 | End: 2024-04-26 | Stop reason: HOSPADM

## 2024-04-25 RX ORDER — SODIUM CHLORIDE 9 MG/ML
40 INJECTION, SOLUTION INTRAVENOUS AS NEEDED
Status: CANCELLED | OUTPATIENT
Start: 2024-04-25

## 2024-04-25 RX ORDER — ONDANSETRON 2 MG/ML
4 INJECTION INTRAMUSCULAR; INTRAVENOUS EVERY 6 HOURS PRN
Status: DISCONTINUED | OUTPATIENT
Start: 2024-04-25 | End: 2024-04-26 | Stop reason: HOSPADM

## 2024-04-25 RX ORDER — ALBUTEROL SULFATE 90 UG/1
2 AEROSOL, METERED RESPIRATORY (INHALATION) EVERY 4 HOURS PRN
Status: DISCONTINUED | OUTPATIENT
Start: 2024-04-25 | End: 2024-04-26 | Stop reason: HOSPADM

## 2024-04-25 RX ORDER — ACETAMINOPHEN 325 MG/1
650 TABLET ORAL EVERY 4 HOURS PRN
Status: DISCONTINUED | OUTPATIENT
Start: 2024-04-25 | End: 2024-04-26 | Stop reason: HOSPADM

## 2024-04-25 RX ORDER — MAGNESIUM HYDROXIDE 1200 MG/15ML
LIQUID ORAL AS NEEDED
Status: DISCONTINUED | OUTPATIENT
Start: 2024-04-25 | End: 2024-04-25 | Stop reason: HOSPADM

## 2024-04-25 RX ORDER — SODIUM CHLORIDE 0.9 % (FLUSH) 0.9 %
3 SYRINGE (ML) INJECTION EVERY 12 HOURS SCHEDULED
Status: DISCONTINUED | OUTPATIENT
Start: 2024-04-25 | End: 2024-04-25 | Stop reason: HOSPADM

## 2024-04-25 RX ORDER — SODIUM CHLORIDE 0.9 % (FLUSH) 0.9 %
10 SYRINGE (ML) INJECTION EVERY 12 HOURS SCHEDULED
Status: CANCELLED | OUTPATIENT
Start: 2024-04-25

## 2024-04-25 RX ORDER — NALOXONE HCL 0.4 MG/ML
0.4 VIAL (ML) INJECTION AS NEEDED
Status: DISCONTINUED | OUTPATIENT
Start: 2024-04-25 | End: 2024-04-25 | Stop reason: HOSPADM

## 2024-04-25 RX ORDER — SODIUM CHLORIDE 0.9 % (FLUSH) 0.9 %
3 SYRINGE (ML) INJECTION EVERY 12 HOURS SCHEDULED
Status: DISCONTINUED | OUTPATIENT
Start: 2024-04-25 | End: 2024-04-26 | Stop reason: HOSPADM

## 2024-04-25 RX ORDER — SODIUM CHLORIDE 0.9 % (FLUSH) 0.9 %
3-10 SYRINGE (ML) INJECTION AS NEEDED
Status: DISCONTINUED | OUTPATIENT
Start: 2024-04-25 | End: 2024-04-25 | Stop reason: HOSPADM

## 2024-04-25 RX ORDER — PRAVASTATIN SODIUM 40 MG
40 TABLET ORAL DAILY
Status: DISCONTINUED | OUTPATIENT
Start: 2024-04-25 | End: 2024-04-26 | Stop reason: HOSPADM

## 2024-04-25 RX ORDER — DIPHENHYDRAMINE HCL 25 MG
25 CAPSULE ORAL NIGHTLY PRN
Status: DISCONTINUED | OUTPATIENT
Start: 2024-04-25 | End: 2024-04-26 | Stop reason: HOSPADM

## 2024-04-25 RX ORDER — LABETALOL HYDROCHLORIDE 5 MG/ML
5 INJECTION, SOLUTION INTRAVENOUS
Status: DISCONTINUED | OUTPATIENT
Start: 2024-04-25 | End: 2024-04-25 | Stop reason: HOSPADM

## 2024-04-25 RX ORDER — BUPIVACAINE HYDROCHLORIDE AND EPINEPHRINE 2.5; 5 MG/ML; UG/ML
INJECTION, SOLUTION EPIDURAL; INFILTRATION; INTRACAUDAL; PERINEURAL AS NEEDED
Status: DISCONTINUED | OUTPATIENT
Start: 2024-04-25 | End: 2024-04-25 | Stop reason: HOSPADM

## 2024-04-25 RX ORDER — NALOXONE HCL 0.4 MG/ML
0.4 VIAL (ML) INJECTION
Status: DISCONTINUED | OUTPATIENT
Start: 2024-04-25 | End: 2024-04-26 | Stop reason: HOSPADM

## 2024-04-25 RX ORDER — ACETAMINOPHEN 500 MG
1000 TABLET ORAL ONCE
Status: COMPLETED | OUTPATIENT
Start: 2024-04-25 | End: 2024-04-25

## 2024-04-25 RX ORDER — HYDROMORPHONE HYDROCHLORIDE 1 MG/ML
0.5 INJECTION, SOLUTION INTRAMUSCULAR; INTRAVENOUS; SUBCUTANEOUS
Status: DISCONTINUED | OUTPATIENT
Start: 2024-04-25 | End: 2024-04-25 | Stop reason: HOSPADM

## 2024-04-25 RX ORDER — PHENYLEPHRINE HCL IN 0.9% NACL 1 MG/10 ML
SYRINGE (ML) INTRAVENOUS AS NEEDED
Status: DISCONTINUED | OUTPATIENT
Start: 2024-04-25 | End: 2024-04-25 | Stop reason: SURG

## 2024-04-25 RX ORDER — FERROUS SULFATE 325(65) MG
325 TABLET ORAL 3 TIMES WEEKLY
Status: DISCONTINUED | OUTPATIENT
Start: 2024-04-26 | End: 2024-04-26 | Stop reason: HOSPADM

## 2024-04-25 RX ORDER — PANTOPRAZOLE SODIUM 40 MG/1
40 TABLET, DELAYED RELEASE ORAL
Status: DISCONTINUED | OUTPATIENT
Start: 2024-04-26 | End: 2024-04-26 | Stop reason: HOSPADM

## 2024-04-25 RX ORDER — SODIUM CHLORIDE 0.9 % (FLUSH) 0.9 %
10 SYRINGE (ML) INJECTION AS NEEDED
Status: CANCELLED | OUTPATIENT
Start: 2024-04-25

## 2024-04-25 RX ORDER — MONTELUKAST SODIUM 10 MG/1
10 TABLET ORAL NIGHTLY
Status: DISCONTINUED | OUTPATIENT
Start: 2024-04-25 | End: 2024-04-26 | Stop reason: HOSPADM

## 2024-04-25 RX ORDER — FAMOTIDINE 10 MG/ML
20 INJECTION, SOLUTION INTRAVENOUS ONCE
Status: DISCONTINUED | OUTPATIENT
Start: 2024-04-25 | End: 2024-04-25 | Stop reason: HOSPADM

## 2024-04-25 RX ORDER — MIDAZOLAM HYDROCHLORIDE 1 MG/ML
0.5 INJECTION INTRAMUSCULAR; INTRAVENOUS
Status: DISCONTINUED | OUTPATIENT
Start: 2024-04-25 | End: 2024-04-25 | Stop reason: HOSPADM

## 2024-04-25 RX ORDER — IPRATROPIUM BROMIDE AND ALBUTEROL SULFATE 2.5; .5 MG/3ML; MG/3ML
3 SOLUTION RESPIRATORY (INHALATION) ONCE AS NEEDED
Status: DISCONTINUED | OUTPATIENT
Start: 2024-04-25 | End: 2024-04-25 | Stop reason: HOSPADM

## 2024-04-25 RX ORDER — OXYCODONE HCL 10 MG/1
10 TABLET, FILM COATED, EXTENDED RELEASE ORAL ONCE
Status: COMPLETED | OUTPATIENT
Start: 2024-04-25 | End: 2024-04-25

## 2024-04-25 RX ORDER — ONDANSETRON 2 MG/ML
4 INJECTION INTRAMUSCULAR; INTRAVENOUS ONCE AS NEEDED
Status: DISCONTINUED | OUTPATIENT
Start: 2024-04-25 | End: 2024-04-25 | Stop reason: HOSPADM

## 2024-04-25 RX ORDER — MORPHINE SULFATE 2 MG/ML
2 INJECTION, SOLUTION INTRAMUSCULAR; INTRAVENOUS EVERY 4 HOURS PRN
Status: DISCONTINUED | OUTPATIENT
Start: 2024-04-25 | End: 2024-04-26 | Stop reason: HOSPADM

## 2024-04-25 RX ORDER — DROPERIDOL 2.5 MG/ML
0.62 INJECTION, SOLUTION INTRAMUSCULAR; INTRAVENOUS
Status: DISCONTINUED | OUTPATIENT
Start: 2024-04-25 | End: 2024-04-25 | Stop reason: HOSPADM

## 2024-04-25 RX ORDER — LIDOCAINE HYDROCHLORIDE 10 MG/ML
INJECTION, SOLUTION EPIDURAL; INFILTRATION; INTRACAUDAL; PERINEURAL AS NEEDED
Status: DISCONTINUED | OUTPATIENT
Start: 2024-04-25 | End: 2024-04-25 | Stop reason: SURG

## 2024-04-25 RX ORDER — FAMOTIDINE 20 MG/1
20 TABLET, FILM COATED ORAL ONCE
Status: COMPLETED | OUTPATIENT
Start: 2024-04-25 | End: 2024-04-25

## 2024-04-25 RX ORDER — MEPERIDINE HYDROCHLORIDE 25 MG/ML
12.5 INJECTION INTRAMUSCULAR; INTRAVENOUS; SUBCUTANEOUS
Status: DISCONTINUED | OUTPATIENT
Start: 2024-04-25 | End: 2024-04-25 | Stop reason: HOSPADM

## 2024-04-25 RX ORDER — ROCURONIUM BROMIDE 10 MG/ML
INJECTION, SOLUTION INTRAVENOUS AS NEEDED
Status: DISCONTINUED | OUTPATIENT
Start: 2024-04-25 | End: 2024-04-25 | Stop reason: SURG

## 2024-04-25 RX ORDER — BISACODYL 10 MG
10 SUPPOSITORY, RECTAL RECTAL DAILY PRN
Status: DISCONTINUED | OUTPATIENT
Start: 2024-04-25 | End: 2024-04-26 | Stop reason: HOSPADM

## 2024-04-25 RX ORDER — SODIUM CHLORIDE 0.9 % (FLUSH) 0.9 %
10 SYRINGE (ML) INJECTION AS NEEDED
Status: DISCONTINUED | OUTPATIENT
Start: 2024-04-25 | End: 2024-04-26 | Stop reason: HOSPADM

## 2024-04-25 RX ORDER — IBUPROFEN 800 MG/1
800 TABLET ORAL ONCE
Status: COMPLETED | OUTPATIENT
Start: 2024-04-25 | End: 2024-04-25

## 2024-04-25 RX ORDER — DILTIAZEM HYDROCHLORIDE 240 MG/1
240 CAPSULE, COATED, EXTENDED RELEASE ORAL DAILY
Status: DISCONTINUED | OUTPATIENT
Start: 2024-04-26 | End: 2024-04-26 | Stop reason: HOSPADM

## 2024-04-25 RX ORDER — SODIUM CHLORIDE 9 MG/ML
INJECTION, SOLUTION INTRAVENOUS AS NEEDED
Status: DISCONTINUED | OUTPATIENT
Start: 2024-04-25 | End: 2024-04-25 | Stop reason: HOSPADM

## 2024-04-25 RX ORDER — HYDROCODONE BITARTRATE AND ACETAMINOPHEN 5; 325 MG/1; MG/1
1 TABLET ORAL ONCE AS NEEDED
Status: DISCONTINUED | OUTPATIENT
Start: 2024-04-25 | End: 2024-04-25 | Stop reason: HOSPADM

## 2024-04-25 RX ORDER — FENTANYL CITRATE 50 UG/ML
50 INJECTION, SOLUTION INTRAMUSCULAR; INTRAVENOUS
Status: DISCONTINUED | OUTPATIENT
Start: 2024-04-25 | End: 2024-04-25 | Stop reason: HOSPADM

## 2024-04-25 RX ORDER — SODIUM CHLORIDE, SODIUM LACTATE, POTASSIUM CHLORIDE, CALCIUM CHLORIDE 600; 310; 30; 20 MG/100ML; MG/100ML; MG/100ML; MG/100ML
90 INJECTION, SOLUTION INTRAVENOUS CONTINUOUS
Status: DISCONTINUED | OUTPATIENT
Start: 2024-04-25 | End: 2024-04-26

## 2024-04-25 RX ORDER — ONDANSETRON 4 MG/1
4 TABLET, ORALLY DISINTEGRATING ORAL EVERY 6 HOURS PRN
Status: DISCONTINUED | OUTPATIENT
Start: 2024-04-25 | End: 2024-04-26 | Stop reason: HOSPADM

## 2024-04-25 RX ORDER — GABAPENTIN 300 MG/1
600 CAPSULE ORAL EVERY 12 HOURS SCHEDULED
Status: DISCONTINUED | OUTPATIENT
Start: 2024-04-25 | End: 2024-04-26 | Stop reason: HOSPADM

## 2024-04-25 RX ORDER — FLUTICASONE PROPIONATE 50 MCG
1 SPRAY, SUSPENSION (ML) NASAL 2 TIMES DAILY
Status: DISCONTINUED | OUTPATIENT
Start: 2024-04-25 | End: 2024-04-26 | Stop reason: HOSPADM

## 2024-04-25 RX ORDER — EPHEDRINE SULFATE 50 MG/ML
INJECTION INTRAVENOUS AS NEEDED
Status: DISCONTINUED | OUTPATIENT
Start: 2024-04-25 | End: 2024-04-25 | Stop reason: SURG

## 2024-04-25 RX ORDER — BUDESONIDE AND FORMOTEROL FUMARATE DIHYDRATE 160; 4.5 UG/1; UG/1
1 AEROSOL RESPIRATORY (INHALATION)
Status: DISCONTINUED | OUTPATIENT
Start: 2024-04-25 | End: 2024-04-26 | Stop reason: HOSPADM

## 2024-04-25 RX ORDER — PROMETHAZINE HYDROCHLORIDE 25 MG/1
25 TABLET ORAL ONCE AS NEEDED
Status: DISCONTINUED | OUTPATIENT
Start: 2024-04-25 | End: 2024-04-25 | Stop reason: HOSPADM

## 2024-04-25 RX ORDER — SODIUM CHLORIDE, SODIUM LACTATE, POTASSIUM CHLORIDE, CALCIUM CHLORIDE 600; 310; 30; 20 MG/100ML; MG/100ML; MG/100ML; MG/100ML
9 INJECTION, SOLUTION INTRAVENOUS CONTINUOUS
Status: DISCONTINUED | OUTPATIENT
Start: 2024-04-25 | End: 2024-04-26 | Stop reason: HOSPADM

## 2024-04-25 RX ORDER — PROPOFOL 10 MG/ML
VIAL (ML) INTRAVENOUS AS NEEDED
Status: DISCONTINUED | OUTPATIENT
Start: 2024-04-25 | End: 2024-04-25 | Stop reason: SURG

## 2024-04-25 RX ORDER — PROMETHAZINE HYDROCHLORIDE 12.5 MG/1
12.5 TABLET ORAL EVERY 6 HOURS PRN
Status: DISCONTINUED | OUTPATIENT
Start: 2024-04-25 | End: 2024-04-26 | Stop reason: HOSPADM

## 2024-04-25 RX ORDER — HYDRALAZINE HYDROCHLORIDE 20 MG/ML
5 INJECTION INTRAMUSCULAR; INTRAVENOUS
Status: DISCONTINUED | OUTPATIENT
Start: 2024-04-25 | End: 2024-04-25 | Stop reason: HOSPADM

## 2024-04-25 RX ADMIN — SODIUM CHLORIDE, POTASSIUM CHLORIDE, SODIUM LACTATE AND CALCIUM CHLORIDE 90 ML/HR: 600; 310; 30; 20 INJECTION, SOLUTION INTRAVENOUS at 18:50

## 2024-04-25 RX ADMIN — HYDROMORPHONE HYDROCHLORIDE 0.5 MG: 1 INJECTION, SOLUTION INTRAMUSCULAR; INTRAVENOUS; SUBCUTANEOUS at 16:19

## 2024-04-25 RX ADMIN — FENTANYL CITRATE 50 MCG: 50 INJECTION, SOLUTION INTRAMUSCULAR; INTRAVENOUS at 16:08

## 2024-04-25 RX ADMIN — SODIUM CHLORIDE 2 G: 900 INJECTION INTRAVENOUS at 21:51

## 2024-04-25 RX ADMIN — DEXAMETHASONE SODIUM PHOSPHATE 4 MG: 4 INJECTION INTRA-ARTICULAR; INTRALESIONAL; INTRAMUSCULAR; INTRAVENOUS; SOFT TISSUE at 14:39

## 2024-04-25 RX ADMIN — FENTANYL CITRATE 50 MCG: 50 INJECTION, SOLUTION INTRAMUSCULAR; INTRAVENOUS at 16:33

## 2024-04-25 RX ADMIN — ROCURONIUM BROMIDE 100 MG: 10 INJECTION INTRAVENOUS at 14:31

## 2024-04-25 RX ADMIN — FENTANYL CITRATE 50 MCG: 50 INJECTION, SOLUTION INTRAMUSCULAR; INTRAVENOUS at 17:05

## 2024-04-25 RX ADMIN — PRAVASTATIN SODIUM 40 MG: 40 TABLET ORAL at 18:50

## 2024-04-25 RX ADMIN — CLONAZEPAM 1 MG: 1 TABLET ORAL at 21:24

## 2024-04-25 RX ADMIN — MONTELUKAST 10 MG: 10 TABLET, FILM COATED ORAL at 21:12

## 2024-04-25 RX ADMIN — ACETAMINOPHEN 1000 MG: 500 TABLET ORAL at 13:15

## 2024-04-25 RX ADMIN — PROPOFOL 200 MG: 10 INJECTION, EMULSION INTRAVENOUS at 14:31

## 2024-04-25 RX ADMIN — Medication 50 MCG: at 15:10

## 2024-04-25 RX ADMIN — IBUPROFEN 800 MG: 800 TABLET, FILM COATED ORAL at 13:15

## 2024-04-25 RX ADMIN — IBUPROFEN 600 MG: 600 TABLET, FILM COATED ORAL at 21:12

## 2024-04-25 RX ADMIN — LOSARTAN POTASSIUM 100 MG: 50 TABLET, FILM COATED ORAL at 18:49

## 2024-04-25 RX ADMIN — LIDOCAINE HYDROCHLORIDE 50 MG: 10 INJECTION, SOLUTION EPIDURAL; INFILTRATION; INTRACAUDAL; PERINEURAL at 14:31

## 2024-04-25 RX ADMIN — ONDANSETRON 4 MG: 2 INJECTION INTRAMUSCULAR; INTRAVENOUS at 15:31

## 2024-04-25 RX ADMIN — ROCURONIUM BROMIDE 10 MG: 10 INJECTION INTRAVENOUS at 15:16

## 2024-04-25 RX ADMIN — DROPERIDOL 0.62 MG: 2.5 INJECTION, SOLUTION INTRAMUSCULAR; INTRAVENOUS at 16:51

## 2024-04-25 RX ADMIN — HYDROMORPHONE HYDROCHLORIDE 0.5 MG: 1 INJECTION, SOLUTION INTRAMUSCULAR; INTRAVENOUS; SUBCUTANEOUS at 16:49

## 2024-04-25 RX ADMIN — MORPHINE SULFATE 2 MG: 2 INJECTION, SOLUTION INTRAMUSCULAR; INTRAVENOUS at 21:51

## 2024-04-25 RX ADMIN — FENTANYL CITRATE 100 MCG: 50 INJECTION, SOLUTION INTRAMUSCULAR; INTRAVENOUS at 14:31

## 2024-04-25 RX ADMIN — Medication 3 ML: at 21:14

## 2024-04-25 RX ADMIN — SODIUM CHLORIDE, POTASSIUM CHLORIDE, SODIUM LACTATE AND CALCIUM CHLORIDE 9 ML/HR: 600; 310; 30; 20 INJECTION, SOLUTION INTRAVENOUS at 12:56

## 2024-04-25 RX ADMIN — SUGAMMADEX 200 MG: 100 INJECTION, SOLUTION INTRAVENOUS at 15:49

## 2024-04-25 RX ADMIN — OXYCODONE HYDROCHLORIDE 10 MG: 10 TABLET, FILM COATED, EXTENDED RELEASE ORAL at 13:15

## 2024-04-25 RX ADMIN — GABAPENTIN 600 MG: 300 CAPSULE ORAL at 21:12

## 2024-04-25 RX ADMIN — SODIUM CHLORIDE, POTASSIUM CHLORIDE, SODIUM LACTATE AND CALCIUM CHLORIDE: 600; 310; 30; 20 INJECTION, SOLUTION INTRAVENOUS at 14:27

## 2024-04-25 RX ADMIN — OXYCODONE HYDROCHLORIDE AND ACETAMINOPHEN 1 TABLET: 7.5; 325 TABLET ORAL at 18:50

## 2024-04-25 RX ADMIN — FAMOTIDINE 20 MG: 20 TABLET, FILM COATED ORAL at 13:15

## 2024-04-25 RX ADMIN — LIDOCAINE HYDROCHLORIDE 0.5 ML: 10 INJECTION, SOLUTION EPIDURAL; INFILTRATION; INTRACAUDAL; PERINEURAL at 12:56

## 2024-04-25 RX ADMIN — METFORMIN HYDROCHLORIDE 1500 MG: 500 TABLET, EXTENDED RELEASE ORAL at 18:50

## 2024-04-25 RX ADMIN — SODIUM CHLORIDE 2 G: 900 INJECTION INTRAVENOUS at 14:34

## 2024-04-25 RX ADMIN — FLUTICASONE PROPIONATE 1 SPRAY: 50 SPRAY, METERED NASAL at 21:13

## 2024-04-25 RX ADMIN — BUDESONIDE AND FORMOTEROL FUMARATE DIHYDRATE 1 PUFF: 160; 4.5 AEROSOL RESPIRATORY (INHALATION) at 20:56

## 2024-04-25 RX ADMIN — EPHEDRINE SULFATE 5 MG: 50 INJECTION INTRAVENOUS at 15:21

## 2024-04-25 NOTE — ANESTHESIA PREPROCEDURE EVALUATION
Anesthesia Evaluation     Patient summary reviewed and Nursing notes reviewed   no history of anesthetic complications:   NPO Solid Status: > 8 hours  NPO Liquid Status: > 8 hours           Airway   Mallampati: II  TM distance: >3 FB  Neck ROM: full  No difficulty expected  Dental      Comment: L front upper tooth is loose!      Pulmonary - normal exam    breath sounds clear to auscultation  (+) asthma,sleep apnea  Cardiovascular - normal exam  Exercise tolerance: good (4-7 METS)    Rhythm: regular  Rate: normal    (+) hypertension, hyperlipidemia      Neuro/Psych  (+) numbness  GI/Hepatic/Renal/Endo    (+) GERD, liver disease, diabetes mellitus type 2 well controlled    Musculoskeletal     Abdominal    Substance History      OB/GYN          Other   arthritis,   history of cancer                Anesthesia Plan    ASA 3     general     intravenous induction     Anesthetic plan, risks, benefits, and alternatives have been provided, discussed and informed consent has been obtained with: patient.    Plan discussed with CRNA.    CODE STATUS:

## 2024-04-25 NOTE — ANESTHESIA POSTPROCEDURE EVALUATION
Patient: Yanira Bernal    Procedure Summary       Date: 04/25/24 Room / Location:  [facility]    Anesthesia Start: 1427 Anesthesia Stop: 1606    Procedure: LUMBAR LAMINECTOMY DISCECTOMY DECOMPRESSION POSTERIOR L4-5 (Spine Lumbar) Diagnosis:       Lumbar stenosis with neurogenic claudication      (Lumbar stenosis with neurogenic claudication [M48.062])    Surgeons: Dheeraj Wellington MD Provider: Santiago Carlos MD    Anesthesia Type: general ASA Status: 3            Anesthesia Type: general    Vitals  Vitals Value Taken Time   BP     Temp     Pulse 80 04/25/24 1605   Resp     SpO2 94 % 04/25/24 1605   Vitals shown include unfiled device data.        Post Anesthesia Care and Evaluation    Patient location during evaluation: PACU  Patient participation: complete - patient participated  Level of consciousness: awake and alert  Pain management: adequate    Airway patency: patent  Anesthetic complications: No anesthetic complications  PONV Status: none  Cardiovascular status: hemodynamically stable and acceptable  Respiratory status: nonlabored ventilation, acceptable and nasal cannula  Hydration status: acceptable    Comments: /65  HR 81  Sats 95  Temp 97.7  RN notified of patients dry/cracked lips that was noted prior to induction.

## 2024-04-25 NOTE — OP NOTE
NEUROSURGICAL OPERATIVE NOTE        PREOPERATIVE DIAGNOSIS:    Lumbar stenosis with neurogenic claudication      POSTOPERATIVE DIAGNOSIS:  Same      PROCEDURE:  L4-5 laminectomy with medial facetectomies and foraminotomies      SURGEON:  Dheeraj Wellington M.D.      ASSISTANT: Juany Bingham PA-C    PAC assisted with:   Suctioning   Retraction   Tying   Suturing   Closing   Application of dressing   Skilled neurosurgery PA assistance was necessary to perform this procedure.        ANESTHESIA:  General      ESTIMATED BLOOD LOSS: 50 mL      SPECIMEN: None      DRAINS: 7 flat JACOBY      COMPLICATIONS:  None      CLINICAL NOTE:  The patient is a 68-year-old woman with progressive back and lower extremity pain with walking and standing intolerance.  Studies demonstrate high-grade lumbar stenosis at L4-5 and as such she presents at this time for lumbar decompression.  The nature of the procedure as well as the potential risks, complications, limitations, and alternatives to the procedure were discussed at length with the patient and the patient has agreed to proceed with surgery.      TECHNICAL NOTE:  The patient was brought to the operating room and while on her cart general endotracheal anesthesia was achieved. She was then turned prone onto the Cloward saddle frame. Special care was ensured to protect pressure points. Her low back was prepared and draped in the usual fashion. A localizing radiograph was obtained with a spinal needle in the lumbosacral midline. Based on that, a several centimeter vertical incision was fashioned overlying the L4-5 level. Underlying tissues were divided with cautery to provide exposure to the posterior spinal elements. Another radiograph confirmed the operative level. Leksell rongeur was then utilized to remove the spinous processes at L4 and the upper aspect of L5. Central trough was fashioned with drill and Kerrison punches. This was widened using the same instruments. The facet  complexes were undercut. The neural foramina were decompressed. Good decompression was achieved. After the decompression a blunt probe could readily be passed along the L4 and L5 nerve roots on each side. Bleeding points were controlled with bipolar cautery, Floseal and bone wax. With a Valsalva maneuver there was no significant bleeding or evidence of CSF leak. After washing out the wound with a saline solution, some thin strips of Gelfoam were left in the superficial gutters. A #7 flat JACOBY drain was brought in through a separate stab incision and left in the epidural space. The paraspinous muscle and fascia were reapproximated in interrupted fashion with #0 Vicryl suture. Then 0.25% Marcaine was instilled in the paraspinous musculature and subcutaneous tissues. The subcutaneous tissues were closed in layers with 2-0 followed by 3-0 Vicryl suture. The skin was closed in a running subcuticular fashion with 3-0 Vicryl suture. A dermal sealant and sterile dressing were applied. She was rolled onto her cart, extubated and taken to the recovery room in satisfactory condition.             Dheeraj Wellington M.D.

## 2024-04-26 VITALS
HEART RATE: 60 BPM | WEIGHT: 229 LBS | BODY MASS INDEX: 34.71 KG/M2 | DIASTOLIC BLOOD PRESSURE: 83 MMHG | OXYGEN SATURATION: 95 % | HEIGHT: 68 IN | TEMPERATURE: 97.9 F | SYSTOLIC BLOOD PRESSURE: 148 MMHG | RESPIRATION RATE: 16 BRPM

## 2024-04-26 DIAGNOSIS — M48.062 LUMBAR STENOSIS WITH NEUROGENIC CLAUDICATION: ICD-10-CM

## 2024-04-26 PROCEDURE — 25010000002 CEFAZOLIN PER 500 MG: Performed by: NEUROLOGICAL SURGERY

## 2024-04-26 PROCEDURE — 63710000001 IBUPROFEN 600 MG TABLET: Performed by: NEUROLOGICAL SURGERY

## 2024-04-26 PROCEDURE — 63710000001 PANTOPRAZOLE 40 MG TABLET DELAYED-RELEASE: Performed by: NEUROLOGICAL SURGERY

## 2024-04-26 PROCEDURE — A9270 NON-COVERED ITEM OR SERVICE: HCPCS | Performed by: NEUROLOGICAL SURGERY

## 2024-04-26 PROCEDURE — 63710000001 ACETAMINOPHEN 325 MG TABLET: Performed by: NEUROLOGICAL SURGERY

## 2024-04-26 PROCEDURE — 97116 GAIT TRAINING THERAPY: CPT

## 2024-04-26 PROCEDURE — 94799 UNLISTED PULMONARY SVC/PX: CPT

## 2024-04-26 PROCEDURE — 63710000001 TIOTROPIUM BROMIDE MONOHYDRATE 2.5 MCG/ACT AEROSOL SOLUTION 4 G INHALER: Performed by: NEUROLOGICAL SURGERY

## 2024-04-26 PROCEDURE — 63710000001 FERROUS SULFATE 325 (65 FE) MG TABLET: Performed by: NEUROLOGICAL SURGERY

## 2024-04-26 PROCEDURE — 97110 THERAPEUTIC EXERCISES: CPT

## 2024-04-26 PROCEDURE — 97535 SELF CARE MNGMENT TRAINING: CPT

## 2024-04-26 PROCEDURE — 97162 PT EVAL MOD COMPLEX 30 MIN: CPT

## 2024-04-26 PROCEDURE — 97165 OT EVAL LOW COMPLEX 30 MIN: CPT

## 2024-04-26 PROCEDURE — 63710000001 LOSARTAN 50 MG TABLET: Performed by: NEUROLOGICAL SURGERY

## 2024-04-26 PROCEDURE — 94664 DEMO&/EVAL PT USE INHALER: CPT

## 2024-04-26 PROCEDURE — 99024 POSTOP FOLLOW-UP VISIT: CPT | Performed by: NEUROLOGICAL SURGERY

## 2024-04-26 PROCEDURE — 63710000001 OXYCODONE-ACETAMINOPHEN 7.5-325 MG TABLET: Performed by: NEUROLOGICAL SURGERY

## 2024-04-26 PROCEDURE — 63710000001 GABAPENTIN 300 MG CAPSULE: Performed by: NEUROLOGICAL SURGERY

## 2024-04-26 PROCEDURE — 63710000001 DILTIAZEM CD 240 MG CAPSULE SUSTAINED-RELEASE 24 HR: Performed by: NEUROLOGICAL SURGERY

## 2024-04-26 RX ORDER — OXYCODONE HYDROCHLORIDE AND ACETAMINOPHEN 5; 325 MG/1; MG/1
1 TABLET ORAL 3 TIMES DAILY PRN
Qty: 12 TABLET | Refills: 0 | Status: SHIPPED | OUTPATIENT
Start: 2024-04-26

## 2024-04-26 RX ORDER — OXYCODONE HYDROCHLORIDE AND ACETAMINOPHEN 5; 325 MG/1; MG/1
1 TABLET ORAL 3 TIMES DAILY PRN
Qty: 12 TABLET | Refills: 0 | Status: SHIPPED | OUTPATIENT
Start: 2024-04-26 | End: 2024-04-26 | Stop reason: SDUPTHER

## 2024-04-26 RX ORDER — NALOXONE HYDROCHLORIDE 4 MG/.1ML
SPRAY NASAL
Qty: 2 EACH | Refills: 0 | Status: SHIPPED | OUTPATIENT
Start: 2024-04-26

## 2024-04-26 RX ADMIN — TIOTROPIUM BROMIDE INHALATION SPRAY 2 PUFF: 3.12 SPRAY, METERED RESPIRATORY (INHALATION) at 10:38

## 2024-04-26 RX ADMIN — PANTOPRAZOLE SODIUM 40 MG: 40 TABLET, DELAYED RELEASE ORAL at 05:40

## 2024-04-26 RX ADMIN — FLUTICASONE PROPIONATE 1 SPRAY: 50 SPRAY, METERED NASAL at 08:30

## 2024-04-26 RX ADMIN — LOSARTAN POTASSIUM 100 MG: 50 TABLET, FILM COATED ORAL at 08:27

## 2024-04-26 RX ADMIN — IBUPROFEN 600 MG: 600 TABLET, FILM COATED ORAL at 13:02

## 2024-04-26 RX ADMIN — SODIUM CHLORIDE 2 G: 900 INJECTION INTRAVENOUS at 05:40

## 2024-04-26 RX ADMIN — OXYCODONE HYDROCHLORIDE AND ACETAMINOPHEN 1 TABLET: 7.5; 325 TABLET ORAL at 02:15

## 2024-04-26 RX ADMIN — FERROUS SULFATE TAB 325 MG (65 MG ELEMENTAL FE) 325 MG: 325 (65 FE) TAB at 08:28

## 2024-04-26 RX ADMIN — GABAPENTIN 600 MG: 300 CAPSULE ORAL at 08:28

## 2024-04-26 RX ADMIN — BUDESONIDE AND FORMOTEROL FUMARATE DIHYDRATE 1 PUFF: 160; 4.5 AEROSOL RESPIRATORY (INHALATION) at 10:38

## 2024-04-26 RX ADMIN — OXYCODONE HYDROCHLORIDE AND ACETAMINOPHEN 1 TABLET: 7.5; 325 TABLET ORAL at 06:27

## 2024-04-26 RX ADMIN — DILTIAZEM HYDROCHLORIDE 240 MG: 240 CAPSULE, EXTENDED RELEASE ORAL at 08:28

## 2024-04-26 RX ADMIN — OXYCODONE HYDROCHLORIDE AND ACETAMINOPHEN 1 TABLET: 7.5; 325 TABLET ORAL at 10:39

## 2024-04-26 RX ADMIN — OXYCODONE HYDROCHLORIDE AND ACETAMINOPHEN 1 TABLET: 7.5; 325 TABLET ORAL at 14:37

## 2024-04-26 RX ADMIN — IBUPROFEN 600 MG: 600 TABLET, FILM COATED ORAL at 05:40

## 2024-04-26 RX ADMIN — ACETAMINOPHEN 650 MG: 325 TABLET ORAL at 08:28

## 2024-04-26 NOTE — PLAN OF CARE
Problem: Adult Inpatient Plan of Care  Goal: Plan of Care Review  Outcome: Met  Goal: Patient-Specific Goal (Individualized)  Outcome: Met  Goal: Absence of Hospital-Acquired Illness or Injury  Outcome: Met  Intervention: Identify and Manage Fall Risk  Recent Flowsheet Documentation  Taken 4/26/2024 1415 by Letty Muller RN  Safety Promotion/Fall Prevention:   activity supervised   assistive device/personal items within reach   clutter free environment maintained   fall prevention program maintained   gait belt   toileting scheduled   safety round/check completed   room organization consistent   nonskid shoes/slippers when out of bed  Taken 4/26/2024 1230 by Letty Muller RN  Safety Promotion/Fall Prevention:   activity supervised   assistive device/personal items within reach   clutter free environment maintained   fall prevention program maintained   gait belt   toileting scheduled   safety round/check completed   room organization consistent   nonskid shoes/slippers when out of bed  Taken 4/26/2024 1030 by Letty Muller RN  Safety Promotion/Fall Prevention:   activity supervised   assistive device/personal items within reach   clutter free environment maintained   fall prevention program maintained   gait belt   toileting scheduled   safety round/check completed   room organization consistent   nonskid shoes/slippers when out of bed  Taken 4/26/2024 0830 by Letty Muller, RN  Safety Promotion/Fall Prevention:   activity supervised   assistive device/personal items within reach   clutter free environment maintained   fall prevention program maintained   gait belt   toileting scheduled   safety round/check completed   room organization consistent   nonskid shoes/slippers when out of bed  Intervention: Prevent Skin Injury  Recent Flowsheet Documentation  Taken 4/26/2024 1415 by Letty Muller, RN  Body Position: supine  Taken 4/26/2024 1230 by Letty Muller RN  Body Position:  supine  Taken 4/26/2024 1030 by Letty Muller RN  Body Position: (up in chair) other (see comments)  Taken 4/26/2024 0830 by Letty Muller RN  Body Position: sitting up in bed  Intervention: Prevent and Manage VTE (Venous Thromboembolism) Risk  Recent Flowsheet Documentation  Taken 4/26/2024 1415 by Letty Muller RN  VTE Prevention/Management:   bilateral   sequential compression devices on  Taken 4/26/2024 1230 by Letty Muller RN  VTE Prevention/Management:   bilateral   sequential compression devices off  Taken 4/26/2024 1030 by Letty Muller RN  VTE Prevention/Management:   bilateral   sequential compression devices on  Taken 4/26/2024 0830 by Letty Muller RN  VTE Prevention/Management:   bilateral   sequential compression devices on  Intervention: Prevent Infection  Recent Flowsheet Documentation  Taken 4/26/2024 1415 by Letty Muller RN  Infection Prevention: environmental surveillance performed  Taken 4/26/2024 1230 by Letty Muller RN  Infection Prevention: environmental surveillance performed  Taken 4/26/2024 1030 by Letty Muller RN  Infection Prevention: environmental surveillance performed  Taken 4/26/2024 0830 by Letty Muller RN  Infection Prevention: environmental surveillance performed  Goal: Optimal Comfort and Wellbeing  Outcome: Met  Intervention: Monitor Pain and Promote Comfort  Recent Flowsheet Documentation  Taken 4/26/2024 1030 by Letty Muller RN  Pain Management Interventions: see MAR  Intervention: Provide Person-Centered Care  Recent Flowsheet Documentation  Taken 4/26/2024 1415 by Letty Muller RN  Trust Relationship/Rapport: care explained  Taken 4/26/2024 1230 by Letty Muller RN  Trust Relationship/Rapport: care explained  Taken 4/26/2024 1030 by Letty Muller RN  Trust Relationship/Rapport:   care explained   choices provided  Taken 4/26/2024 0830 by Letty Muller RN  Trust Relationship/Rapport:   care  explained   choices provided   questions encouraged   questions answered   reassurance provided   thoughts/feelings acknowledged  Goal: Readiness for Transition of Care  Outcome: Met     Problem: Bleeding (Spinal Surgery)  Goal: Absence of Bleeding  Outcome: Met     Problem: Bowel Motility Impaired (Spinal Surgery)  Goal: Effective Bowel Elimination  Outcome: Met     Problem: Fluid and Electrolyte Imbalance (Spinal Surgery)  Goal: Fluid and Electrolyte Balance  Outcome: Met     Problem: Functional Ability Impaired (Spinal Surgery)  Goal: Optimal Functional Ability  Outcome: Met  Intervention: Optimize Functional Status  Recent Flowsheet Documentation  Taken 4/26/2024 1415 by Letty Muller RN  Positioning/Transfer Devices:   pillows   in use  Taken 4/26/2024 1230 by Letty Muller, SANTA  Positioning/Transfer Devices:   pillows   in use  Taken 4/26/2024 1030 by Letty Muller RN  Positioning/Transfer Devices:   pillows   in use  Taken 4/26/2024 0830 by Letty Muller RN  Positioning/Transfer Devices:   pillows   in use     Problem: Infection (Spinal Surgery)  Goal: Absence of Infection Signs and Symptoms  Outcome: Met  Intervention: Prevent or Manage Infection  Recent Flowsheet Documentation  Taken 4/26/2024 1415 by Letty Muller RN  Infection Prevention: environmental surveillance performed  Taken 4/26/2024 1230 by Letty Muller RN  Infection Prevention: environmental surveillance performed  Taken 4/26/2024 1030 by Letty Muller RN  Infection Prevention: environmental surveillance performed  Taken 4/26/2024 0830 by Letty Muller RN  Infection Prevention: environmental surveillance performed     Problem: Neurologic Impairment (Spinal Surgery)  Goal: Optimal Neurologic Function  Outcome: Met  Intervention: Optimize Neurologic Function  Recent Flowsheet Documentation  Taken 4/26/2024 1415 by Letty Muller RN  Body Position: supine  Taken 4/26/2024 1230 by Letty Muller,  RN  Body Position: supine  Taken 4/26/2024 1030 by Letty Muller RN  Body Position: (up in chair) other (see comments)  Taken 4/26/2024 0830 by Letty Muller RN  Body Position: sitting up in bed     Problem: Ongoing Anesthesia Effects (Spinal Surgery)  Goal: Anesthesia/Sedation Recovery  Outcome: Met  Intervention: Optimize Anesthesia Recovery  Recent Flowsheet Documentation  Taken 4/26/2024 1415 by Letty Muller RN  Safety Promotion/Fall Prevention:   activity supervised   assistive device/personal items within reach   clutter free environment maintained   fall prevention program maintained   gait belt   toileting scheduled   safety round/check completed   room organization consistent   nonskid shoes/slippers when out of bed  Taken 4/26/2024 1230 by Letty Muller RN  Safety Promotion/Fall Prevention:   activity supervised   assistive device/personal items within reach   clutter free environment maintained   fall prevention program maintained   gait belt   toileting scheduled   safety round/check completed   room organization consistent   nonskid shoes/slippers when out of bed  Taken 4/26/2024 1030 by Letty Muller, RN  Safety Promotion/Fall Prevention:   activity supervised   assistive device/personal items within reach   clutter free environment maintained   fall prevention program maintained   gait belt   toileting scheduled   safety round/check completed   room organization consistent   nonskid shoes/slippers when out of bed  Taken 4/26/2024 0830 by Letty Muller RN  Safety Promotion/Fall Prevention:   activity supervised   assistive device/personal items within reach   clutter free environment maintained   fall prevention program maintained   gait belt   toileting scheduled   safety round/check completed   room organization consistent   nonskid shoes/slippers when out of bed     Problem: Pain (Spinal Surgery)  Goal: Acceptable Pain Control  Outcome: Met  Intervention: Prevent or  Manage Pain  Recent Flowsheet Documentation  Taken 4/26/2024 1415 by Letty Muller RN  Diversional Activities: television  Taken 4/26/2024 1230 by Letty Muller RN  Diversional Activities:   television   smartphone  Taken 4/26/2024 1030 by Letty Muller RN  Pain Management Interventions: see MAR  Diversional Activities:   smartphone   tablet  Taken 4/26/2024 0830 by Letty Muller, RN  Diversional Activities:   television   smartphone     Problem: Postoperative Nausea and Vomiting (Spinal Surgery)  Goal: Nausea and Vomiting Relief  Outcome: Met     Problem: Postoperative Urinary Retention (Spinal Surgery)  Goal: Effective Urinary Elimination  Outcome: Met     Problem: Respiratory Compromise (Spinal Surgery)  Goal: Effective Oxygenation and Ventilation  Outcome: Met  Intervention: Optimize Oxygenation and Ventilation  Recent Flowsheet Documentation  Taken 4/26/2024 1415 by Letty Muller RN  Head of Bed (HOB) Positioning: HOB at 30 degrees  Taken 4/26/2024 1230 by Letty Muller RN  Head of Bed (HOB) Positioning: HOB at 30-45 degrees  Taken 4/26/2024 1030 by Letty Muller RN  Head of Bed (HOB) Positioning: HOB at 60 degrees  Taken 4/26/2024 0830 by Letty Muller RN  Head of Bed (HOB) Positioning: HOB at 45 degrees     Problem: Asthma Comorbidity  Goal: Maintenance of Asthma Control  Outcome: Met  Intervention: Maintain Asthma Symptom Control  Recent Flowsheet Documentation  Taken 4/26/2024 0830 by Letty Muller RN  Medication Review/Management: medications reviewed     Problem: Behavioral Health Comorbidity  Goal: Maintenance of Behavioral Health Symptom Control  Outcome: Met  Intervention: Maintain Behavioral Health Symptom Control  Recent Flowsheet Documentation  Taken 4/26/2024 0830 by Letty Muller RN  Medication Review/Management: medications reviewed     Problem: COPD (Chronic Obstructive Pulmonary Disease) Comorbidity  Goal: Maintenance of COPD Symptom  Control  Outcome: Met  Intervention: Maintain COPD-Symptom Control  Recent Flowsheet Documentation  Taken 4/26/2024 0830 by Letty Muller RN  Medication Review/Management: medications reviewed     Problem: Diabetes Comorbidity  Goal: Blood Glucose Level Within Targeted Range  Outcome: Met     Problem: Heart Failure Comorbidity  Goal: Maintenance of Heart Failure Symptom Control  Outcome: Met  Intervention: Maintain Heart Failure-Management  Recent Flowsheet Documentation  Taken 4/26/2024 0830 by Letty Muller, RN  Medication Review/Management: medications reviewed     Problem: Hypertension Comorbidity  Goal: Blood Pressure in Desired Range  Outcome: Met  Intervention: Maintain Blood Pressure Management  Recent Flowsheet Documentation  Taken 4/26/2024 0830 by Letty Muller RN  Medication Review/Management: medications reviewed     Problem: Obstructive Sleep Apnea Risk or Actual Comorbidity Management  Goal: Unobstructed Breathing During Sleep  Outcome: Met     Problem: Osteoarthritis Comorbidity  Goal: Maintenance of Osteoarthritis Symptom Control  Outcome: Met  Intervention: Maintain Osteoarthritis Symptom Control  Recent Flowsheet Documentation  Taken 4/26/2024 0830 by Letty Muller RN  Medication Review/Management: medications reviewed     Problem: Pain Chronic (Persistent) (Comorbidity Management)  Goal: Acceptable Pain Control and Functional Ability  Outcome: Met  Intervention: Manage Persistent Pain  Recent Flowsheet Documentation  Taken 4/26/2024 0830 by Letty Muller RN  Medication Review/Management: medications reviewed  Intervention: Develop Pain Management Plan  Recent Flowsheet Documentation  Taken 4/26/2024 1030 by Letty Muller RN  Pain Management Interventions: see MAR  Intervention: Optimize Psychosocial Wellbeing  Recent Flowsheet Documentation  Taken 4/26/2024 1415 by Letty Muller, RN  Diversional Activities: television  Family/Support System Care: support  provided  Taken 4/26/2024 1230 by Letty Muller, RN  Diversional Activities:   television   smartphone  Family/Support System Care: support provided  Taken 4/26/2024 1030 by Letty Muller, RN  Diversional Activities:   smartphone   tablet  Family/Support System Care: support provided  Taken 4/26/2024 0830 by Letty Muller, RN  Diversional Activities:   television   smartphone  Family/Support System Care:   self-care encouraged   support provided     Problem: Seizure Disorder Comorbidity  Goal: Maintenance of Seizure Control  Outcome: Met   Goal Outcome Evaluation:

## 2024-04-26 NOTE — THERAPY DISCHARGE NOTE
"Acute Care - Occupational Therapy Discharge  [facility]    Patient Name: Yanira Bernal  : 1955    MRN: [id number]                              Today's Date: 2024       Admit Date: 2024    Visit Dx:     ICD-10-CM ICD-9-CM   1. Lumbar stenosis with neurogenic claudication  M48.062 724.03     Patient Active Problem List   Diagnosis    Lumbar stenosis with neurogenic claudication     Past Medical History:   Diagnosis Date    Abnormal ECG     R side bundle branch block    Anesthesia complication     \"felt like I couldn't breathe\"    Ankle sprain     Arthritis of back     Asthma 2012    Cancer 2005    Squamous cell skin cancer x3, basal cell x2    Diabetes mellitus 2020    GERD (gastroesophageal reflux disease)     Hyperlipidemia 2010    Hypertension 2002    Knee swelling     bilateral    Liver disease     QUIROS    Lumbosacral disc disease 1979    MRSA (methicillin resistant Staphylococcus aureus) 2018    Peripheral neuropathy 1991    Rotator cuff syndrome 2015    Sleep apnea     uses CPAP    Tear of meniscus of knee 2018    Left     Past Surgical History:   Procedure Laterality Date    APPENDECTOMY      COLONOSCOPY      DIAGNOSTIC LAPAROSCOPY      x2    ENDOSCOPY      EPIDURAL BLOCK  [id number]    Childbirth    EYE SURGERY Left     strabismus    FOOT SURGERY  R baby toe- Hammer toe    KNEE SURGERY  R knee &. L knee 2018    meniscus tear, arthroscopy    LAPAROSCOPIC CHOLECYSTECTOMY      LAPAROTOMY OVARIAN CYSTECTOMY  1982    LUMBAR LAMINECTOMY DISCECTOMY DECOMPRESSION N/A 2024    Procedure: LUMBAR LAMINECTOMY DISCECTOMY DECOMPRESSION POSTERIOR L4-5;  Surgeon: Dheeraj Wellington MD;  Location: [facility];  Service: Neurosurgery;  Laterality: N/A;    ROTATOR CUFF REPAIR Right     + 1 screw    SHOULDER SURGERY  &&2017    Left x2, Right x1    SINUS SURGERY      x2    SMALL INTESTINE SURGERY      \"just untwisted it, not a resection\"    THYROIDECTOMY, PARTIAL   "    TRIGGER POINT INJECTION Left 02/01/2024    L middle finger      General Information       Row Name 04/26/24 1418          OT Time and Intention    Document Type discharge evaluation/summary  -KF     Mode of Treatment occupational therapy;individual therapy  -KF       Row Name 04/26/24 1418          General Information    Patient Profile Reviewed yes  -KF     Prior Level of Function independent:;all household mobility;community mobility;bed mobility;ADL's;driving  -KF     Existing Precautions/Restrictions spinal  -KF     Barriers to Rehab none identified  -KF       Row Name 04/26/24 1418          Occupational Profile    Environmental Supports and Barriers (Occupational Profile) walk-in shower  -KF       Row Name 04/26/24 1418          Living Environment    People in Home spouse  -KF       Row Name 04/26/24 1418          Home Main Entrance    Number of Stairs, Main Entrance three  -KF     Stair Railings, Main Entrance none  -KF       Row Name 04/26/24 1418          Stairs Within Home, Primary    Number of Stairs, Within Home, Primary none  -KF       Row Name 04/26/24 1418          Cognition    Orientation Status (Cognition) oriented x 4  -KF       Row Name 04/26/24 1418          Safety Issues, Functional Mobility    Safety Issues Affecting Function (Mobility) other (see comments)  none identified  -KF     Impairments Affecting Function (Mobility) pain  -KF               User Key  (r) = Recorded By, (t) = Taken By, (c) = Cosigned By      Initials Name Provider Type    KF Soraida Quiñonez, CANDACE Occupational Therapist                   Mobility/ADL's       Row Name 04/26/24 1419          Bed Mobility    Bed Mobility sit-supine  -KF     Sit-Supine [place] (Bed Mobility) standby assist  -KF     Comment, (Bed Mobility) Reviewed the log roll technique for bed mobility in order to maintain spinal precautions  -KF       Row Name 04/26/24 1419          Transfers    Transfers sit-stand transfer;stand-sit transfer  -KF        [facility] Name 04/26/24 1419          Sit-Stand Transfer    Sit-Stand [place] (Transfers) independent  -[facility] Name 04/26/24 1419          Stand-Sit Transfer    Stand-Sit [place] (Transfers) independent  -KF       Row Name 04/26/24 1419          Functional Mobility    Functional Mobility- Ind. Level supervision required  -     Functional Mobility- Device walker, front-wheeled  -KF     Functional Mobility- Comment Pt ambulated >HH distance using a RWx with supervision. No LOB noted.  -[facility] Name 04/26/24 1419          Activities of Daily Living    BADL Assessment/Intervention bathing;lower body dressing  -KF       Row Name 04/26/24 1419          Bathing Assessment/Intervention    Assistive Devices (Bathing) long-handled sponge  -     Comment, (Bathing) Pt provided and educated on use of a long handled sponge to assist in bathing. Pt verbalized understanding.  -[facility] Name 04/26/24 1419          Lower Body Dressing Assessment/Training    [place] Level (Lower Body Dressing) doff;socks;don;shoes/slippers;set up  -     Assistive Devices (Lower Body Dressing) long-handled shoe horn;reacher;sock-aid  -     Position (Lower Body Dressing) unsupported sitting  -KF     Comment, (Lower Body Dressing) Pt educated on spinal precautions and implications during LBD. Pt provided and educated on use of AE to assist during ADLs. Pt verbalized understanding. Pt able to achieve figure four position to doff socks and don shoes. Additional dressing was deferred as lumbar drain still present.  -KF               User Key  (r) = Recorded By, (t) = Taken By, (c) = Cosigned By      Initials Name Provider Type    KF Soraida Quiñonez OT Occupational Therapist                   Obj/Interventions       [facility] Name 04/26/24 1422          Sensory Assessment (Somatosensory)    Sensory Assessment (Somatosensory) UE sensation intact  -KF       Row Name 04/26/24 1422          Vision Assessment/Intervention    Visual  Impairment/Limitations corrective lenses full-time  -KF       Row Name 04/26/24 1422          Range of Motion Comprehensive    General Range of Motion bilateral upper extremity ROM WFL  -KF       Row Name 04/26/24 1422          Strength Comprehensive (MMT)    General Manual Muscle Testing (MMT) Assessment upper extremity strength deficits identified  -KF     Comment, General Manual Muscle Testing (MMT) Assessment BUE grossly 4+/5  -KF       Row Name 04/26/24 1422          Balance    Balance Assessment sitting static balance;sitting dynamic balance;sit to stand dynamic balance;standing static balance;standing dynamic balance  -KF     Static Sitting Balance independent  -KF     Dynamic Sitting Balance supervision  -KF     Position, Sitting Balance unsupported;sitting in chair  -KF     Sit to Stand Dynamic Balance independent  -KF     Static Standing Balance independent  -KF     Dynamic Standing Balance supervision  -KF     Position/Device Used, Standing Balance supported;walker, front-wheeled  -KF     Balance Interventions sitting;standing;sit to stand;supported;static;dynamic;occupation based/functional task  -KF               User Key  (r) = Recorded By, (t) = Taken By, (c) = Cosigned By      Initials Name Provider Type    KF Soraida Quiñonez, OT Occupational Therapist                   Goals/Plan    No documentation.                  Clinical Impression       Row Name 04/26/24 1423          Pain Assessment    Pretreatment Pain Rating 7/10  -KF     Posttreatment Pain Rating 7/10  -KF     Pain Location lower  -KF     Pain Location - back  -KF     Pain Intervention(s) Repositioned;Cold applied;Ambulation/increased activity  -KF       Row Name 04/26/24 1423          Plan of Care Review    Plan of Care Reviewed With patient  -KF     Progress no change  -KF     Outcome Evaluation OT evaluation completed. The pt presents near her functional baseline of independence during ADLs/mobility, however is limited by acute pain in  her post-operative state. The pt was educated on spinal precautions, log roll technique during bed mobility, and ADL implications. The pt was provided and educated on AE to assist in ADLs. Pt verbalized understanding. The pt ambulated a HH distance using a RWx with supervision. Pt with no current need for IP OT services, OT will sign off as pt with probable discharge home today. Recommend a d/c home with assist when medically ready.  -       Row Name 04/26/24 1423          Therapy Assessment/Plan (OT)    Criteria for Skilled Therapeutic Interventions Met (OT) no problems identified which require skilled intervention  -KF     Therapy Frequency (OT) evaluation only  -KF       Row Name 04/26/24 1423          Therapy Plan Review/Discharge Plan (OT)    Anticipated Discharge Disposition (OT) home with assist  -       Row Name 04/26/24 1423          Vital Signs    Pre Patient Position Sitting  -KF     Intra Patient Position Standing  -KF     Post Patient Position Supine  -       Row Name 04/26/24 1423          Positioning and Restraints    Pre-Treatment Position sitting in chair/recliner  -KF     Post Treatment Position bed  -KF     In Bed notified nsg;supine;call light within reach;encouraged to call for assist;exit alarm on  -KF               User Key  (r) = Recorded By, (t) = Taken By, (c) = Cosigned By      Initials Name Provider Type    Soraida Jacobo, CANDACE Occupational Therapist                   Outcome Measures       Row Name 04/26/24 1426          How much help from another is currently needed...    Putting on and taking off regular lower body clothing? 3  -KF     Bathing (including washing, rinsing, and drying) 3  -KF     Toileting (which includes using toilet bed pan or urinal) 4  -KF     Putting on and taking off regular upper body clothing 4  -KF     Taking care of personal grooming (such as brushing teeth) 4  -KF     Eating meals 4  -KF     AM-PAC 6 Clicks Score (OT) 22  -KF       Row Name 04/26/24  1053 04/26/24 0830       How much help from another person do you currently need...    Turning from your back to your side while in flat bed without using bedrails? 4  -SC 4  -TS    Moving from lying on back to sitting on the side of a flat bed without bedrails? 3  -SC 4  -TS    Moving to and from a bed to a chair (including a wheelchair)? 4  -SC 3  -TS    Standing up from a chair using your arms (e.g., wheelchair, bedside chair)? 4  -SC 3  -TS    Climbing 3-5 steps with a railing? 3  -SC 3  -TS    To walk in hospital room? 4  -SC 3  -TS    AM-PAC 6 Clicks Score (PT) 22  -SC 20  -TS    Highest Level of Mobility Goal 7 --> Walk 25 feet or more  -SC 6 --> Walk 10 steps or more  -TS      Row Name 04/26/24 1426 04/26/24 1053       Functional Assessment    Outcome Measure Options AM-PAC 6 Clicks Daily Activity (OT)  -KF AM-PAC 6 Clicks Basic Mobility (PT)  -SC              User Key  (r) = Recorded By, (t) = Taken By, (c) = Cosigned By      Initials Name Provider Type    SC Sarahi Velez, PT Physical Therapist    TS Letty Muller, RN Registered Nurse    Soraida Jacobo OT Occupational Therapist                  Occupational Therapy Education       Title: PT OT SLP Therapies (Done)       Topic: Occupational Therapy (Done)       Point: ADL training (Done)       Description:   Instruct learner(s) on proper safety adaptation and remediation techniques during self care or transfers.   Instruct in proper use of assistive devices.                  Learning Progress Summary             Patient Acceptance, E,TB, VU,DU by KF at 4/26/2024 1243                         Point: Precautions (Done)       Description:   Instruct learner(s) on prescribed precautions during self-care and functional transfers.                  Learning Progress Summary             Patient Acceptance, E,TB, VU,DU by KF at 4/26/2024 1243                         Point: Body mechanics (Done)       Description:   Instruct learner(s) on proper positioning  and spine alignment during self-care, functional mobility activities and/or exercises.                  Learning Progress Summary             Patient Acceptance, E,TB, VU,DU by  at 4/26/2024 2063                                         User Key       Initials Effective Dates Name Provider Type Discipline    MARSHA 08/09/23 -  Soraida Quiñonez OT Occupational Therapist OT                  OT Recommendation and Plan  Therapy Frequency (OT): evaluation only  Plan of Care Review  Plan of Care Reviewed With: patient  Progress: no change  Outcome Evaluation: OT evaluation completed. The pt presents near her functional baseline of independence during ADLs/mobility, however is limited by acute pain in her post-operative state. The pt was educated on spinal precautions, log roll technique during bed mobility, and ADL implications. The pt was provided and educated on AE to assist in ADLs. Pt verbalized understanding. The pt ambulated a HH distance using a RWx with supervision. Pt with no current need for IP OT services, OT will sign off as pt with probable discharge home today. Recommend a d/c home with assist when medically ready.  Plan of Care Reviewed With: patient  Outcome Evaluation: OT evaluation completed. The pt presents near her functional baseline of independence during ADLs/mobility, however is limited by acute pain in her post-operative state. The pt was educated on spinal precautions, log roll technique during bed mobility, and ADL implications. The pt was provided and educated on AE to assist in ADLs. Pt verbalized understanding. The pt ambulated a HH distance using a RWx with supervision. Pt with no current need for IP OT services, OT will sign off as pt with probable discharge home today. Recommend a d/c home with assist when medically ready.     Time Calculation:   Evaluation Complexity (OT)  Review Occupational Profile/Medical/Therapy History Complexity: brief/low complexity  Assessment, Occupational  Performance/Identification of Deficit Complexity: 1-3 performance deficits  Clinical Decision Making Complexity (OT): problem focused assessment/low complexity  Overall Complexity of Evaluation (OT): low complexity     Time Calculation- OT       Row Name 04/26/24 1427 04/26/24 0956          Time Calculation- OT    OT Start Time 1243  -KF --     OT Received On 04/26/24  -KF --        Timed Charges    53793 - Gait Training Minutes  -- 15  -SC     31779 - OT Therapeutic Activity Minutes 5  -KF --     47865 - OT Self Care/Mgmt Minutes 6  -KF --        Untimed Charges    OT Eval/Re-eval Minutes 35  -KF --        Total Minutes    Timed Charges Total Minutes 11  -KF 15  -SC     Untimed Charges Total Minutes 35  -KF --      Total Minutes 46  -KF 15  -SC               User Key  (r) = Recorded By, (t) = Taken By, (c) = Cosigned By      Initials Name Provider Type    SC Sarahi Velez, PT Physical Therapist    KF Soraida Quiñonez, OT Occupational Therapist                  Therapy Charges for Today       Code Description Service Date Service Provider Modifiers Qty    [id number] HC OT EVAL LOW COMPLEXITY 3 4/26/2024 Soraida Quiñonez OT GO 1    [id number]  OT SELF CARE/MGMT/TRAIN EA 15 MIN 4/26/2024 Soraida Quiñonez OT GO 1               OT Discharge Summary  Anticipated Discharge Disposition (OT): home with assist    Soraida Quiñonez OT  4/26/2024

## 2024-04-26 NOTE — THERAPY DISCHARGE NOTE
"Patient Name: Yanira Bernal  : 1955    MRN: [id number]                              Today's Date: 2024       Admit Date: 2024    Visit Dx:     ICD-10-CM ICD-9-CM   1. Lumbar stenosis with neurogenic claudication  M48.062 724.03     Patient Active Problem List   Diagnosis    Lumbar stenosis with neurogenic claudication     Past Medical History:   Diagnosis Date    Abnormal ECG     R side bundle branch block    Anesthesia complication     \"felt like I couldn't breathe\"    Ankle sprain     Arthritis of back     Asthma 2012    Cancer 2005    Squamous cell skin cancer x3, basal cell x2    Diabetes mellitus 2020    GERD (gastroesophageal reflux disease)     Hyperlipidemia 2010    Hypertension 2002    Knee swelling     bilateral    Liver disease     QUIROS    Lumbosacral disc disease     MRSA (methicillin resistant Staphylococcus aureus) 2018    Peripheral neuropathy 1991    Rotator cuff syndrome 2015    Sleep apnea     uses CPAP    Tear of meniscus of knee 2018    Left     Past Surgical History:   Procedure Laterality Date    APPENDECTOMY      COLONOSCOPY      DIAGNOSTIC LAPAROSCOPY      x2    ENDOSCOPY      EPIDURAL BLOCK  3422-5693    Childbirth    EYE SURGERY Left     strabismus    FOOT SURGERY  R baby toe- Hammer toe    KNEE SURGERY  R knee &. L knee 2018    meniscus tear, arthroscopy    LAPAROSCOPIC CHOLECYSTECTOMY      LAPAROTOMY OVARIAN CYSTECTOMY  1982    LUMBAR LAMINECTOMY DISCECTOMY DECOMPRESSION N/A 2024    Procedure: LUMBAR LAMINECTOMY DISCECTOMY DECOMPRESSION POSTERIOR L4-5;  Surgeon: Dheeraj Wellington MD;  Location: [facility];  Service: Neurosurgery;  Laterality: N/A;    ROTATOR CUFF REPAIR Right     + 1 screw    SHOULDER SURGERY  &&2017    Left x2, Right x1    SINUS SURGERY      x2    SMALL INTESTINE SURGERY      \"just untwisted it, not a resection\"    THYROIDECTOMY, PARTIAL      TRIGGER POINT INJECTION Left 2024    L middle finger "      General Information       Row Name 04/26/24 1044          Physical Therapy Time and Intention    Document Type evaluation  -SC     Mode of Treatment physical therapy  -[facility] Name 04/26/24 1044          General Information    Patient Profile Reviewed yes  -SC     Prior Level of Function independent:;gait;transfer;all household mobility;community mobility  -SC     Existing Precautions/Restrictions spinal;fall  -SC     Barriers to Rehab none identified  -[facility] Name 04/26/24 1044          Living Environment    People in Home spouse  -[facility] Name 04/26/24 1044          Home Main Entrance    Number of Stairs, Main Entrance three  -SC     Stair Railings, Main Entrance none  -SC       Row Name 04/26/24 1044          Stairs Within Home, Primary    Number of Stairs, Within Home, Primary none  -SC       Row Name 04/26/24 1044          Cognition    Orientation Status (Cognition) oriented x 4  -[facility] Name 04/26/24 1044          Safety Issues, Functional Mobility    Impairments Affecting Function (Mobility) endurance/activity tolerance;pain;motor control;balance  -SC     Comment, Safety Issues/Impairments (Mobility) alert, following commands  -SC               User Key  (r) = Recorded By, (t) = Taken By, (c) = Cosigned By      Initials Name Provider Type    SC Sarahi Velez, PT Physical Therapist                   Mobility       [facility] Name 04/26/24 1045          Bed Mobility    Bed Mobility supine-sit;scooting/bridging  -SC     Scooting/Bridging [place] (Bed Mobility) verbal cues;independent  -SC     Supine-Sit [place] (Bed Mobility) verbal cues;contact guard  -SC     Comment, (Bed Mobility) reviewed log rolling with cues to bend knees first and reach for edge of bed or caregiver assistance. Demonstrated good technique  -SC       Row Name 04/26/24 1045          Transfers    Comment, (Transfers) demonstrated slow, careful transfers  -[facility] 04/26/24 1045          Sit-Stand  Transfer    Sit-Stand [place] (Transfers) modified independence  -SC     Assistive Device (Sit-Stand Transfers) walker, front-wheeled  -SC       Row Name 04/26/24 1045          Gait/Stairs (Locomotion)    [place] Level (Gait) contact guard;verbal cues  -SC     Assistive Device (Gait) walker, front-wheeled  -SC     Distance in Feet (Gait) 500  -SC     Deviations/Abnormal Patterns (Gait) gait speed decreased;stride length decreased  -SC     Assistive Device (Stairs) cane, straight  -SC     Handrail Location (Stairs) right side (ascending);both sides  -SC     Number of Steps (Stairs) 3  3+3  -SC     Comment, (Gait/Stairs) Gt training focused on controling walker in hallway  with upright posture. Demonstrated step through gait pattern with good walker control. No loss of balance noted. Also worked on stairs with and without railing. Issued st cane to assist with stair . She did required hand held assist when no rails available. Overall good technique noted  -SC               User Key  (r) = Recorded By, (t) = Taken By, (c) = Cosigned By      Initials Name Provider Type    SC Sarahi Velez, PT Physical Therapist                   Obj/Interventions       Row Name 04/26/24 1050          Range of Motion Comprehensive    General Range of Motion neck/trunk range of motion deficits identified  -SC     Comment, General Range of Motion painful spine  -SC       Row Name 04/26/24 1050          Strength Comprehensive (MMT)    Comment, General Manual Muscle Testing (MMT) Assessment B LE grossly 4+/5  -[facility] Name 04/26/24 1050          Motor Skills    Therapeutic Exercise shoulder;hip;knee;ankle  -[facility] Name 04/26/24 1050          Shoulder (Therapeutic Exercise)    Shoulder (Therapeutic Exercise) AROM (active range of motion)  -SC     Shoulder AROM (Therapeutic Exercise) bilateral;flexion;extension;10 repetitions  -SC       Row Name 04/26/24 1050          Hip (Therapeutic Exercise)    Hip (Therapeutic  Exercise) AROM (active range of motion)  -SC     Hip AROM (Therapeutic Exercise) bilateral;flexion;extension;aBduction;aDduction;external rotation;internal rotation;supine;10 repetitions  -SC       Row Name 04/26/24 1050          Knee (Therapeutic Exercise)    Knee (Therapeutic Exercise) isometric exercises  -SC     Knee Isometrics (Therapeutic Exercise) bilateral;gluteal sets;quad sets;supine;10 repetitions  -SC       Row Name 04/26/24 1050          Ankle (Therapeutic Exercise)    Ankle (Therapeutic Exercise) AROM (active range of motion)  -SC     Ankle AROM (Therapeutic Exercise) bilateral;dorsiflexion;plantarflexion;10 repetitions  -SC       Row Name 04/26/24 1050          Balance    Balance Assessment standing dynamic balance  -SC     Dynamic Standing Balance 1-person assist;standby assist  -SC     Position/Device Used, Standing Balance supported;walker, rolling  -SC     Comment, Balance no LOB  -SC       Row Name 04/26/24 1050          Sensory Assessment (Somatosensory)    Sensory Assessment (Somatosensory) sensation intact  -SC               User Key  (r) = Recorded By, (t) = Taken By, (c) = Cosigned By      Initials Name Provider Type    SC Sarahi Velez, PT Physical Therapist                   Goals/Plan    No documentation.                  Clinical Impression       Row Name 04/26/24 1052          Pain    Additional Documentation Pain Scale: FACES Pre/Post-Treatment (Group)  -SC       Row Name 04/26/24 1052          Pain Scale: FACES Pre/Post-Treatment    Pain: FACES Scale, Pretreatment 2-->hurts little bit  -SC     Posttreatment Pain Rating 4-->hurts little more  -SC     Pain Location - back  -SC     Pre/Posttreatment Pain Comment no leg pain  -SC       Row Name 04/26/24 1052          Plan of Care Review    Plan of Care Reviewed With patient  -SC     Progress improving  -SC     Outcome Evaluation Patient demonstrated safe mobility out of bed and up in hallway. She does need some assistance on stairs. DEEPALI  cane has been issued for this. She will need a rolling walker for home use. She can benefit from an OT consult before going home with her spouse.  -SC       Row Name 04/26/24 1052          Therapy Assessment/Plan (PT)    Patient/Family Therapy Goals Statement (PT) go home  -SC     Rehab Potential (PT) good, to achieve stated therapy goals  -SC     Criteria for Skilled Interventions Met (PT) yes;meets criteria  -SC     Therapy Frequency (PT) evaluation only  -SC       Row Name 04/26/24 1052          Positioning and Restraints    Pre-Treatment Position in bed  -SC     Post Treatment Position chair  -SC     In Chair notified nsg;reclined;sitting;call light within reach;encouraged to call for assist;exit alarm on  -SC               User Key  (r) = Recorded By, (t) = Taken By, (c) = Cosigned By      Initials Name Provider Type    Sarahi Anglin, PT Physical Therapist                   Outcome Measures       Row Name 04/26/24 1053 04/26/24 0830       How much help from another person do you currently need...    Turning from your back to your side while in flat bed without using bedrails? 4  -SC 4  -TS    Moving from lying on back to sitting on the side of a flat bed without bedrails? 3  -SC 4  -TS    Moving to and from a bed to a chair (including a wheelchair)? 4  -SC 3  -TS    Standing up from a chair using your arms (e.g., wheelchair, bedside chair)? 4  -SC 3  -TS    Climbing 3-5 steps with a railing? 3  -SC 3  -TS    To walk in hospital room? 4  -SC 3  -TS    AM-PAC 6 Clicks Score (PT) 22  -SC 20  -TS    Highest Level of Mobility Goal 7 --> Walk 25 feet or more  -SC 6 --> Walk 10 steps or more  -      Row Name 04/26/24 1053          Functional Assessment    Outcome Measure Options AM-PAC 6 Clicks Basic Mobility (PT)  -SC               User Key  (r) = Recorded By, (t) = Taken By, (c) = Cosigned By      Initials Name Provider Type    SC Sarahi Velez, PT Physical Therapist    Letty Nickerson RN Registered  Nurse                  Physical Therapy Education       Title: PT OT SLP Therapies (Done)       Topic: Physical Therapy (Done)       Point: Mobility training (Done)       Learning Progress Summary             Patient Eager, E,TB,D,H, DU,VU by SC at 4/26/2024 1054    Comment: reviewed back precautions and HEP                         Point: Home exercise program (Done)       Learning Progress Summary             Patient Eager, E,TB,D,H, DU,VU by SC at 4/26/2024 1054    Comment: reviewed back precautions and HEP                         Point: Body mechanics (Done)       Learning Progress Summary             Patient Eager, E,TB,D,H, DU,VU by SC at 4/26/2024 1054    Comment: reviewed back precautions and HEP                         Point: Precautions (Done)       Learning Progress Summary             Patient Eager, E,TB,D,H, DU,VU by SC at 4/26/2024 1054    Comment: reviewed back precautions and HEP                                         User Key       Initials Effective Dates Name Provider Type Discipline    SC 02/03/23 -  Sarahi Velez, PT Physical Therapist PT                  PT Recommendation and Plan     Plan of Care Reviewed With: patient  Progress: improving  Outcome Evaluation: Patient demonstrated safe mobility out of bed and up in hallway. She does need some assistance on stairs. A cane has been issued for this. She will need a rolling walker for home use. She can benefit from an OT consult before going home with her spouse.     Time Calculation:   PT Evaluation Complexity  History, PT Evaluation Complexity: 3 or more personal factors and/or comorbidities  Examination of Body Systems (PT Eval Complexity): total of 4 or more elements  Clinical Presentation (PT Evaluation Complexity): evolving  Clinical Decision Making (PT Evaluation Complexity): moderate complexity  Overall Complexity (PT Evaluation Complexity): moderate complexity     PT Charges       Row Name 04/26/24 0956             Time Calculation     Start Time 0956  -SC      PT Received On 04/26/24  -SC         Timed Charges    30121 - PT Therapeutic Exercise Minutes 15  -SC      85193 - Gait Training Minutes  15  -SC         Untimed Charges    PT Eval/Re-eval Minutes 30  -SC         Total Minutes    Timed Charges Total Minutes 30  -SC      Untimed Charges Total Minutes 30  -SC       Total Minutes 60  -SC                User Key  (r) = Recorded By, (t) = Taken By, (c) = Cosigned By      Initials Name Provider Type    SC Sarahi Velez PT Physical Therapist                  Therapy Charges for Today       Code Description Service Date Service Provider Modifiers Qty    [id number]  PT THER PROC EA 15 MIN 4/26/2024 Sarahi Velez, PT GP 1    [id number] HC GAIT TRAINING EA 15 MIN 4/26/2024 Sarahi Velez, PT GP 1    [id number]  PT EVAL MOD COMPLEXITY 2 4/26/2024 Sarahi Velez PT GP 1            PT G-Codes  Outcome Measure Options: AM-PAC 6 Clicks Basic Mobility (PT)  AM-PAC 6 Clicks Score (PT): 22    PT Discharge Summary  Anticipated Discharge Disposition (PT): home with assist    Sarahi Velez, PT  4/26/2024

## 2024-04-26 NOTE — PROGRESS NOTES
"NEUROSURGERY PROGRESS NOTE     LOS: 0 days   Patient Care Team:  Leticia Menchaca MD as PCP - General (Psychiatry)  Leticia Menchaca MD as Primary Care Provider (Psychiatry)  Dheeraj Wellington MD as Consulting Physician (Neurosurgery)  Leticia Menchaca MD as Referring Physician (Psychiatry)    Chief Complaint: Low back and lower extremity pain with walking and standing intolerance    POD#: 1 Day Post-Op  Procedures:  L4-5 laminectomy    Interval History:   Patient Complaints: Mild incisional pain.  Patient Denies: Preop claudication symptoms.    Vital Signs: Blood pressure 113/57, pulse 70, temperature 98 °F (36.7 °C), temperature source Oral, resp. rate 18, height 172.7 cm (68\"), weight 104 kg (229 lb), SpO2 94%.  Intake/Output:   Intake/Output Summary (Last 24 hours) at 4/26/2024 0637  Last data filed at 4/26/2024 0627  Gross per 24 hour   Intake 1040 ml   Output 815 ml   Net 225 ml       Physical Exam:  Motor fx is intact.  Dry dressing is in place on her incision.      Assessment/Plan:  Lumbar stenosis with neurogenic claudication status post lumbar laminectomy. Drain out midday, home this afternoon. F/U with PAC in approx 3 weeks.      Dheeraj Wellington MD  04/26/24  06:37 EDT   "

## 2024-04-26 NOTE — PLAN OF CARE
Goal Outcome Evaluation:  Plan of Care Reviewed With: patient        Progress: improving  Outcome Evaluation: Patient demonstrated safe mobility out of bed and up in hallway. She does need some assistance on stairs. A cane has been issued for this. She will need a rolling walker for home use. She will be going home with her spouse.      Anticipated Discharge Disposition (PT): home with assist

## 2024-04-26 NOTE — CASE MANAGEMENT/SOCIAL WORK
Continued Stay Note  [facility]     Patient Name: Yanira Bernal  MRN: [id number]  Today's Date: 4/26/2024    Admit Date: 4/25/2024    Plan: Home   Discharge Plan       Row Name 04/26/24 1425       Plan    Plan Home    Plan Comments I spoke with patient in regards to discharge planning. She lives with spouse in [place]. He does much of the house work. She uses CPAP in the home. She was delivered a rolling walker provided by [employer] today. She has Medicare and  [employer].    She has no advanced directives.    Final Discharge Disposition Code 01 - home or self-care                   Discharge Codes    No documentation.                 Expected Discharge Date and Time       Expected Discharge Date Expected Discharge Time    Apr 26, 2024               Leticia Winchester RN

## 2024-04-26 NOTE — PLAN OF CARE
Goal Outcome Evaluation:  Plan of Care Reviewed With: patient        Progress: no change  Outcome Evaluation: OT evaluation completed. The pt presents near her functional baseline of independence during ADLs/mobility, however is limited by acute pain in her post-operative state. The pt was educated on spinal precautions, log roll technique during bed mobility, and ADL implications. The pt was provided and educated on AE to assist in ADLs. Pt verbalized understanding. The pt ambulated a HH distance using a RWx with supervision. Pt with no current need for IP OT services, OT will sign off as pt with probable discharge home today. Recommend a d/c home with assist when medically ready.      Anticipated Discharge Disposition (OT): home with assist

## 2024-04-30 ENCOUNTER — PATIENT MESSAGE (OUTPATIENT)
Dept: NEUROSURGERY | Facility: CLINIC | Age: 69
End: 2024-04-30
Payer: MEDICARE

## 2024-05-01 DIAGNOSIS — M48.062 LUMBAR STENOSIS WITH NEUROGENIC CLAUDICATION: ICD-10-CM

## 2024-05-01 NOTE — TELEPHONE ENCOUNTER
From: Yanira Bernal  To: Dheeraj Wellington  Sent: 4/30/2024 2:14 PM EDT  Subject: Nerve pain     I am 5 days post op Laminectomy and my nerve pain is on my L side. Very painful and it hurts to walk and exercises!!!   I am taking Advil and Tylenol. Also taking my Gabapentin 600 mg BID. Have finished my Percoset as prescribed. I didn’t expect my pain to go up, post-op.   Thank you  Yanira Bernal

## 2024-05-02 RX ORDER — OXYCODONE HYDROCHLORIDE AND ACETAMINOPHEN 5; 325 MG/1; MG/1
1 TABLET ORAL 3 TIMES DAILY PRN
Qty: 12 TABLET | Refills: 0 | Status: SHIPPED | OUTPATIENT
Start: 2024-05-02

## 2024-05-16 ENCOUNTER — OFFICE VISIT (OUTPATIENT)
Dept: NEUROSURGERY | Facility: CLINIC | Age: 69
End: 2024-05-16
Payer: MEDICARE

## 2024-05-16 VITALS
DIASTOLIC BLOOD PRESSURE: 80 MMHG | TEMPERATURE: 97 F | HEIGHT: 68 IN | SYSTOLIC BLOOD PRESSURE: 144 MMHG | WEIGHT: 228 LBS | BODY MASS INDEX: 34.56 KG/M2

## 2024-05-16 DIAGNOSIS — M48.062 LUMBAR STENOSIS WITH NEUROGENIC CLAUDICATION: Primary | ICD-10-CM

## 2024-05-16 RX ORDER — KETOCONAZOLE 20 MG/G
CREAM TOPICAL
COMMUNITY
Start: 2024-04-24

## 2024-05-16 NOTE — PROGRESS NOTES
Subjective     Chief Complaint: back and leg pain                                S/p laminectomy for stenosis with claudication     Patient ID: Yanira Bernal is a 68 y.o. female is here today for follow-up.    History of Present Illness  Ms Bernal is a very pleasant 69yo  woman with progressive back and lower extremity pain with walking and standing intolerance. Studies demonstrated high-grade lumbar stenosis at L4-5 and as such she presented on 4/25/24 for L4/5 laminectomy. Surgery was without complication and she was discharged to home on POD 1. She called the office on 5/1 with complaints of severe left sided nerve pain that was worse than before the surgery. She was reassured that it was likely from nerve manipulation during her procedure. Her PCP increased her gabapentin to 600mg in the morning and 1200 at night and within a few days, the nerve pain subsided. She is now doing very well and is walking regularly. She is eager to increase her activities and has questions about this today. She denies fever, chills, or problems with her incision. She is very pleased with her progress.      The following portions of the patient's history were reviewed and updated as appropriate: allergies, current medications, past family history, past medical history, past social history, past surgical history and problem list.    Family history:   Family History   Problem Relation Age of Onset    Cancer Mother         Lung cancer   Age 59    Early death Mother         59 years old    Thyroid disease Mother     Cancer Father         Eosophigeal cancer  age 66    Diabetes Father         Type 2    Alcohol abuse Father     Hyperlipidemia Father     Cancer Sister         T cell Lymphoma    Alcohol abuse Sister     Depression Sister     Diabetes Brother         Type 2    Asthma Brother        Social history:   Social History     Socioeconomic History    Marital status:    Tobacco Use    Smoking status: Former      Current packs/day: 0.00     Average packs/day: 1.5 packs/day for 10.0 years (15.0 ttl pk-yrs)     Types: Cigarettes     Start date: 1973     Quit date: 1983     Years since quittin.3     Passive exposure: Past    Smokeless tobacco: Never   Vaping Use    Vaping status: Never Used   Substance and Sexual Activity    Alcohol use: Yes     Alcohol/week: 8.0 standard drinks of alcohol     Types: 3 Glasses of wine, 5 Drinks containing 0.5 oz of alcohol per week     Comment: social    Drug use: Never    Sexual activity: Not Currently     Partners: Male     Birth control/protection: Post-menopausal       Review of Systems   Constitutional:  Negative for activity change, appetite change, chills, diaphoresis, fatigue, fever and unexpected weight change.   HENT:  Negative for congestion, dental problem, drooling, ear discharge, ear pain, facial swelling, hearing loss, mouth sores, nosebleeds, postnasal drip, rhinorrhea, sinus pressure, sinus pain, sneezing, sore throat, tinnitus, trouble swallowing and voice change.    Eyes:  Negative for photophobia, pain, discharge, redness, itching and visual disturbance.   Respiratory:  Positive for apnea. Negative for cough, choking, chest tightness, shortness of breath, wheezing and stridor.    Cardiovascular:  Negative for chest pain, palpitations and leg swelling.   Gastrointestinal:  Negative for abdominal distention, abdominal pain, anal bleeding, blood in stool, constipation, diarrhea, nausea, rectal pain and vomiting.   Endocrine: Negative for cold intolerance, heat intolerance, polydipsia, polyphagia and polyuria.   Genitourinary:  Negative for decreased urine volume, difficulty urinating, dyspareunia, dysuria, enuresis, flank pain, frequency, genital sores, hematuria, menstrual problem, pelvic pain, urgency, vaginal bleeding, vaginal discharge and vaginal pain.   Musculoskeletal:  Negative for arthralgias, back pain, gait problem, joint swelling, myalgias, neck pain  "and neck stiffness.   Skin:  Negative for color change, pallor, rash and wound.   Allergic/Immunologic: Positive for environmental allergies. Negative for food allergies and immunocompromised state.   Neurological:  Negative for dizziness, tremors, seizures, syncope, facial asymmetry, speech difficulty, weakness, light-headedness, numbness and headaches.   Hematological:  Negative for adenopathy. Does not bruise/bleed easily.   Psychiatric/Behavioral:  Negative for agitation, behavioral problems, confusion, decreased concentration, dysphoric mood, hallucinations, self-injury, sleep disturbance and suicidal ideas. The patient is not nervous/anxious and is not hyperactive.        Objective   Blood pressure 144/80, temperature 97 °F (36.1 °C), temperature source Infrared, height 172.7 cm (68\"), weight 103 kg (228 lb).  Body mass index is 34.67 kg/m².    Physical Exam  Constitutional:       Appearance: Normal appearance.   Eyes:      Pupils: Pupils are equal, round, and reactive to light.   Cardiovascular:      Pulses: Normal pulses.   ____Pulmonary:      Effort: Pulmonary effort is normal.      Breath sounds: Normal breath sounds.   Musculoskeletal:      Comments: Gait  but steady and independent with no foot drop.    Skin:     Comments: Incision clean, dry, and intact with no swelling, tenderness, or erythema. It is healing very well.   _Neurological:      General: No focal deficit present.      Mental Status:   alert and oriented to person, place, and time.   Psychiatric:         Mood and Affect: Mood normal.         Behavior: Behavior normal.      Assessment & Plan   Ms Bernal had a jessica immediate postop course but is doing much better. She will increase her activity and may drive, go on long walks, and use her recumbent bike. She will do more activity as tolerated for several days and then begin to wean down first her nighttime dose (by 300mg increments) and then her morning dose of gabapentin, as tolerated. We " will plan to see her back in follow up in 4-6 weeks.     Diagnoses and all orders for this visit:    1. Lumbar stenosis with neurogenic claudication (Primary)        Return in about 4 weeks (around 6/13/2024).           This document signed by Amy Warner PA-C May 16, 2024 11:32 EDT      Answers submitted by the patient for this visit:  Primary Reason for Visit (Submitted on 5/9/2024)  What is the primary reason for your visit?: Back Pain

## 2024-06-18 ENCOUNTER — OFFICE VISIT (OUTPATIENT)
Dept: NEUROSURGERY | Facility: CLINIC | Age: 69
End: 2024-06-18
Payer: MEDICARE

## 2024-06-18 VITALS — WEIGHT: 233 LBS | TEMPERATURE: 97.3 F | HEIGHT: 68 IN | BODY MASS INDEX: 35.31 KG/M2

## 2024-06-18 DIAGNOSIS — M48.061 DEGENERATIVE LUMBAR SPINAL STENOSIS: ICD-10-CM

## 2024-06-18 DIAGNOSIS — M47.819 FACET ARTHROPATHY, MULTILEVEL: ICD-10-CM

## 2024-06-18 DIAGNOSIS — M48.062 LUMBAR STENOSIS WITH NEUROGENIC CLAUDICATION: Primary | ICD-10-CM

## 2024-06-18 PROCEDURE — 1159F MED LIST DOCD IN RCRD: CPT | Performed by: NEUROLOGICAL SURGERY

## 2024-06-18 PROCEDURE — 99024 POSTOP FOLLOW-UP VISIT: CPT | Performed by: NEUROLOGICAL SURGERY

## 2024-06-18 PROCEDURE — 1160F RVW MEDS BY RX/DR IN RCRD: CPT | Performed by: NEUROLOGICAL SURGERY

## 2024-06-18 RX ORDER — METHOCARBAMOL 750 MG/1
750 TABLET, FILM COATED ORAL 2 TIMES DAILY PRN
Qty: 60 TABLET | Refills: 0 | Status: SHIPPED | OUTPATIENT
Start: 2024-06-18

## 2024-06-18 NOTE — PROGRESS NOTES
Patient: Yanira Bernal  : 1955    Primary Care Provider: Leticia Menchaca MD    Requesting Provider: As above        History    Chief Complaint: Low back and lower extremity pain with walking and standing intolerance.    History of Present Illness: Ms. Bernal is a 68-year-old woman with progressive symptoms as noted above.  Given high-grade lumbar stenosis she underwent decompressive laminectomy at L4-5 on 2024.  Postoperatively she developed severe left lower extremity pain that responded to an increased dose of gabapentin.  For years she is taking gabapentin 600 mg daily.  That was increased to 1200 mg.  The leg pain is absent.  She is decreased the gabapentin a bit already.  Her back is doing better.  She is quite limited by a right Achilles problem and a bad left knee.    Review of Systems   Constitutional:  Negative for activity change, appetite change, chills, diaphoresis, fatigue, fever and unexpected weight change.   HENT:  Negative for congestion, dental problem, drooling, ear discharge, ear pain, facial swelling, hearing loss, mouth sores, nosebleeds, postnasal drip, rhinorrhea, sinus pressure, sinus pain, sneezing, sore throat, tinnitus, trouble swallowing and voice change.    Eyes:  Negative for photophobia, pain, discharge, redness, itching and visual disturbance.   Respiratory:  Negative for apnea, cough, choking, chest tightness, shortness of breath, wheezing and stridor.    Cardiovascular:  Negative for chest pain, palpitations and leg swelling.   Gastrointestinal:  Negative for abdominal distention, abdominal pain, anal bleeding, blood in stool, constipation, diarrhea, nausea, rectal pain and vomiting.   Endocrine: Negative for cold intolerance, heat intolerance, polydipsia, polyphagia and polyuria.   Genitourinary:  Negative for decreased urine volume, difficulty urinating, dyspareunia, dysuria, enuresis, flank pain, frequency, genital sores, hematuria, menstrual problem,  "pelvic pain, urgency, vaginal bleeding, vaginal discharge and vaginal pain.   Musculoskeletal:  Negative for arthralgias, back pain, gait problem, joint swelling, myalgias, neck pain and neck stiffness.   Skin:  Negative for color change, pallor, rash and wound.   Allergic/Immunologic: Negative for environmental allergies, food allergies and immunocompromised state.   Neurological:  Negative for dizziness, tremors, seizures, syncope, facial asymmetry, speech difficulty, weakness, light-headedness, numbness and headaches.   Hematological:  Negative for adenopathy. Does not bruise/bleed easily.   Psychiatric/Behavioral:  Negative for agitation, behavioral problems, confusion, decreased concentration, dysphoric mood, hallucinations, self-injury, sleep disturbance and suicidal ideas. The patient is not nervous/anxious and is not hyperactive.        The patient's past medical history, past surgical history, family history, and social history have been reviewed at length in the electronic medical record.      Physical Exam:   Temp 97.3 °F (36.3 °C) (Infrared)   Ht 172.7 cm (68\")   Wt 106 kg (233 lb)   BMI 35.43 kg/m²   Her incision looks great.    Medical Decision Making    Diagnosis:   Lumbar stenosis with neurogenic claudication status post decompression.    Treatment Options:   The patient is going to decrease her gabapentin back to 600 mg at night.  If she continues to do well then we may be able to do slowly wean down further on that.  She will steadily increase her activity is much as her right ankle and left knee will permit.  At her request I have prescribed Robaxin.  Flexeril has not been helpful.  I have also ordered physical therapy.      Scribed for Dheeraj Wellington MD by Carla Ramos CMA on 6/18/2024 11:58 EDT       I, Dr. Wellington, personally performed the services described in the documentation, as scribed in my presence, and it is both accurate and complete.  "

## 2024-07-03 ENCOUNTER — OFFICE VISIT (OUTPATIENT)
Age: 69
End: 2024-07-03
Payer: MEDICARE

## 2024-07-03 VITALS
SYSTOLIC BLOOD PRESSURE: 148 MMHG | WEIGHT: 227.9 LBS | BODY MASS INDEX: 34.54 KG/M2 | HEIGHT: 68 IN | DIASTOLIC BLOOD PRESSURE: 90 MMHG

## 2024-07-03 DIAGNOSIS — M17.12 PRIMARY OSTEOARTHRITIS OF LEFT KNEE: Primary | ICD-10-CM

## 2024-07-03 RX ORDER — ONDANSETRON 4 MG/1
4 TABLET, ORALLY DISINTEGRATING ORAL AS NEEDED
COMMUNITY
Start: 2024-06-12 | End: 2025-06-12

## 2024-07-03 RX ORDER — BUPIVACAINE HYDROCHLORIDE 2.5 MG/ML
4 INJECTION, SOLUTION EPIDURAL; INFILTRATION; INTRACAUDAL
Status: COMPLETED | OUTPATIENT
Start: 2024-07-03 | End: 2024-07-03

## 2024-07-03 RX ORDER — TRIAMCINOLONE ACETONIDE 40 MG/ML
40 INJECTION, SUSPENSION INTRA-ARTICULAR; INTRAMUSCULAR
Status: COMPLETED | OUTPATIENT
Start: 2024-07-03 | End: 2024-07-03

## 2024-07-03 RX ORDER — LIDOCAINE HYDROCHLORIDE 10 MG/ML
4 INJECTION, SOLUTION EPIDURAL; INFILTRATION; INTRACAUDAL; PERINEURAL
Status: COMPLETED | OUTPATIENT
Start: 2024-07-03 | End: 2024-07-03

## 2024-07-03 RX ORDER — LANSOPRAZOLE 30 MG/1
30 CAPSULE, DELAYED RELEASE ORAL
COMMUNITY
Start: 2024-06-12

## 2024-07-03 RX ADMIN — LIDOCAINE HYDROCHLORIDE 4 ML: 10 INJECTION, SOLUTION EPIDURAL; INFILTRATION; INTRACAUDAL; PERINEURAL at 10:27

## 2024-07-03 RX ADMIN — TRIAMCINOLONE ACETONIDE 40 MG: 40 INJECTION, SUSPENSION INTRA-ARTICULAR; INTRAMUSCULAR at 10:27

## 2024-07-03 RX ADMIN — BUPIVACAINE HYDROCHLORIDE 4 ML: 2.5 INJECTION, SOLUTION EPIDURAL; INFILTRATION; INTRACAUDAL at 10:27

## 2024-07-03 NOTE — PROGRESS NOTES
"    [facility] [place] [facility] Note        Subjective     CC: Follow-up (5 month follow up- Primary osteoarthritis of left knee )      ILIA Bernal is a 68 y.o. female.  I injected her left knee back in January.  It helped for 4 months.  She feels like the knee wants to give out.  She has been doing physical therapy for her back but it hurts her knee.    Overall, patient's symptoms are worsening.    ROS:    Constiutional:Pt denies fever, chills, nausea, or vomiting.  MSK:as above        Objective      Past Medical History  Past Medical History:   Diagnosis Date    Abnormal ECG     R side bundle branch block    Anesthesia complication     \"felt like I couldn't breathe\"    Ankle sprain     Arthritis of back     Asthma 2012    Cancer 2005    Squamous cell skin cancer x3, basal cell x2    Diabetes mellitus 2020    GERD (gastroesophageal reflux disease)     Hyperlipidemia 2010    Hypertension 2002    Knee swelling     bilateral    Liver disease     QUIROS    Lumbosacral disc disease 1979    MRSA (methicillin resistant Staphylococcus aureus) 2018    Peripheral neuropathy 1991    Rotator cuff syndrome 2015    Sleep apnea     uses CPAP    Tear of meniscus of knee 2018    Left     Social History     Socioeconomic History    Marital status:    Tobacco Use    Smoking status: Former     Current packs/day: 0.00     Average packs/day: 1.5 packs/day for 10.0 years (15.0 ttl pk-yrs)     Types: Cigarettes     Start date: 1973     Quit date: 1983     Years since quittin.4     Passive exposure: Past    Smokeless tobacco: Never   Vaping Use    Vaping status: Never Used   Substance and Sexual Activity    Alcohol use: Yes     Alcohol/week: 8.0 standard drinks of alcohol     Types: 3 Glasses of wine, 5 Drinks containing 0.5 oz of alcohol per week     Comment: social    Drug use: Never    Sexual activity: Not Currently     Partners: Male     Birth control/protection: " "Post-menopausal          Physical Exam  /90   Ht 172.7 cm (67.99\")   Wt 103 kg (227 lb 14.4 oz)   BMI 34.66 kg/m²     Body mass index is 34.66 kg/m².    Patient is well nourished and well developed.        Ortho Exam  Left knee tender medial joint.  Stable ligaments.  Full motion.    Imaging/Labs/EMG Reviewed and Interpreted:  Imaging Results (Last 24 Hours)       ** No results found for the last 24 hours. **              Assessment    Assessment:  1. Primary osteoarthritis of left knee        Plan:  Recommend over the counter anti-inflammatories for pain and/or swelling  I have ordered physical therapy to be done in [place].  I want her to go there instead of [facility]  I injected her left knee with cortisone today.  Cortisone shot I discussed with the patient the potential benefits of performing a therapeutic injection of the cortisone as well as potential risks including but not limited to infection, swelling, pain, bleeding, bruising, nerve/vessel damage, skin color changes, transient elevation in blood glucose levels, and fat atrophy. After informed consent and verifying correct patient, procedure site, and type of procedure, the area was prepped with Hibiclens, ethyl chloride was used to numb the skin. The patient tolerated the procedure well. There were no complications.      Carlos Crisostomo MD  07/03/24  10:25 EDT      Dictated Utilizing Dragon Dictation.  "

## 2024-07-03 NOTE — PROGRESS NOTES
Procedure   - Large Joint Arthrocentesis: L knee on 7/3/2024 10:27 AM  Indications: pain  Details: 21 G needle, superolateral approach  Medications: 4 mL lidocaine PF 1% 1 %; 4 mL bupivacaine (PF) 0.25 %; 40 mg triamcinolone acetonide 40 MG/ML  Outcome: tolerated well, no immediate complications  Procedure, treatment alternatives, risks and benefits explained, specific risks discussed. Consent was given by the patient. Immediately prior to procedure a time out was called to verify the correct patient, procedure, equipment, support staff and site/side marked as required. Patient was prepped and draped in the usual sterile fashion.

## 2024-07-08 ENCOUNTER — OFFICE VISIT (OUTPATIENT)
Dept: ORTHOPEDIC SURGERY | Facility: CLINIC | Age: 69
End: 2024-07-08
Payer: MEDICARE

## 2024-07-08 VITALS
WEIGHT: 227 LBS | HEIGHT: 68 IN | BODY MASS INDEX: 34.4 KG/M2 | DIASTOLIC BLOOD PRESSURE: 74 MMHG | SYSTOLIC BLOOD PRESSURE: 136 MMHG

## 2024-07-08 DIAGNOSIS — M76.61 TENDONITIS, ACHILLES, RIGHT: Primary | ICD-10-CM

## 2024-07-08 PROCEDURE — 1159F MED LIST DOCD IN RCRD: CPT | Performed by: ORTHOPAEDIC SURGERY

## 2024-07-08 PROCEDURE — 1160F RVW MEDS BY RX/DR IN RCRD: CPT | Performed by: ORTHOPAEDIC SURGERY

## 2024-07-08 PROCEDURE — 99214 OFFICE O/P EST MOD 30 MIN: CPT | Performed by: ORTHOPAEDIC SURGERY

## 2024-07-08 NOTE — PROGRESS NOTES
[facility] [place] Office Visit     Office Visit       Date: 07/08/2024   Patient Name: Yanira Bernal  MRN: [id number]  YOB: 1955    Referring Physician: Referring, Self     Chief Complaint:   Chief Complaint   Patient presents with    Right Foot - Pain    Right Ankle - Pain       History of Present Illness: Yanira Bernal is a 68 y.o. female who is here today Achilles pain.  States has been going on since 2008.  Worse with exercise walking or standing.  Helps to use ice.  Denies history of trauma.  Did have previous surgery for hammertoe of right baby toe.  Has tried physical therapy PRP as well as laser therapy.  States was having symptoms on the left however that resolved after PRP.  PRP has not helped on the right.  Patient is a retired nurse.  Since the problem started it is worsened.  Patient denies smoking.  States saw surgeon at  in December who did talk about surgical options as well as Tenex.  She is here today for another opinion.    Subjective   Review of Systems: Review of Systems   Constitutional: Negative.  Negative for chills, fatigue and fever.   HENT: Negative.  Negative for congestion and dental problem.    Eyes: Negative.  Negative for blurred vision.   Respiratory: Negative.  Negative for shortness of breath.    Cardiovascular: Negative.  Negative for leg swelling.   Gastrointestinal: Negative.  Negative for abdominal pain.   Endocrine: Negative.  Negative for polyuria.   Genitourinary: Negative.  Negative for difficulty urinating.   Musculoskeletal:  Positive for arthralgias.   Skin: Negative.    Allergic/Immunologic: Negative.    Neurological: Negative.    Hematological: Negative.  Negative for adenopathy.   Psychiatric/Behavioral: Negative.  Negative for behavioral problems.         Past Medical History:   Past Medical History:   Diagnosis Date    Abnormal ECG 2012    R side bundle branch block    Anesthesia  "complication 1989    \"felt like I couldn't breathe\"    Ankle sprain 1999    Arthritis of back     Asthma 2012    Cancer 2005    Squamous cell skin cancer x3, basal cell x2    Diabetes mellitus 2020    GERD (gastroesophageal reflux disease)     Hyperlipidemia 2010    Hypertension 2002    Knee swelling     bilateral    Liver disease 2020    QUIROS    Lumbosacral disc disease 1979    MRSA (methicillin resistant Staphylococcus aureus) 2018    Peripheral neuropathy 1991    Rotator cuff syndrome 2015    Sleep apnea     uses CPAP    Tear of meniscus of knee 2018    Left       Past Surgical History:   Past Surgical History:   Procedure Laterality Date    APPENDECTOMY      BACK SURGERY  2024    Lamenectomy    COLONOSCOPY      DIAGNOSTIC LAPAROSCOPY      x2    ENDOSCOPY      EPIDURAL BLOCK  [id number]    Childbirth    EYE SURGERY Left     strabismus    FOOT SURGERY  R baby toe- Hammer toe    HAND SURGERY  2/2024    Trigger finger R    KNEE SURGERY  R knee 1988&1989. L knee 2018    meniscus tear, arthroscopy    LAPAROSCOPIC CHOLECYSTECTOMY      LAPAROTOMY OVARIAN CYSTECTOMY  1982    LUMBAR LAMINECTOMY DISCECTOMY DECOMPRESSION N/A 04/25/2024    Procedure: LUMBAR LAMINECTOMY DISCECTOMY DECOMPRESSION POSTERIOR L4-5;  Surgeon: Dheeraj Wellington MD;  Location: [facility];  Service: Neurosurgery;  Laterality: N/A;    ROTATOR CUFF REPAIR Right     + 1 screw    SHOULDER SURGERY  2011&2015&2017    Left x2, Right x1    SINUS SURGERY      x2    SMALL INTESTINE SURGERY      \"just untwisted it, not a resection\"    THYROIDECTOMY, PARTIAL      TRIGGER POINT INJECTION Left 02/01/2024    L middle finger       Family History:   Family History   Problem Relation Age of Onset    Cancer Mother         Lung cancer   Age 59    Early death Mother         59 years old    Thyroid disease Mother     Cancer Father         Eosophigeal cancer  age 66    Diabetes Father         Type 2    Alcohol abuse Father     Hyperlipidemia Father     Cancer Sister         T " cell Lymphoma age 59    Alcohol abuse Sister     Depression Sister     Thyroid disease Sister         Hashimoto’s Thyroiditis    Diabetes Brother         Type 2    Asthma Brother        Social History:   Social History     Socioeconomic History    Marital status:    Tobacco Use    Smoking status: Former     Current packs/day: 0.00     Average packs/day: 1.5 packs/day for 10.0 years (15.0 ttl pk-yrs)     Types: Cigarettes     Start date: 1973     Quit date: 1983     Years since quittin.4     Passive exposure: Past    Smokeless tobacco: Never   Vaping Use    Vaping status: Never Used   Substance and Sexual Activity    Alcohol use: Yes     Alcohol/week: 8.0 standard drinks of alcohol     Types: 3 Glasses of wine, 5 Drinks containing 0.5 oz of alcohol per week     Comment: social    Drug use: Never    Sexual activity: Not Currently     Partners: Male     Birth control/protection: Post-menopausal       Medications:   Current Outpatient Medications:     Advair -21 MCG/ACT inhaler, Inhale 2 puffs 2 (Two) Times a Day., Disp: , Rfl:     albuterol sulfate HFA (ProAir HFA) 108 (90 Base) MCG/ACT inhaler, Inhale 2 puffs Every 4 (Four) Hours As Needed for Wheezing or Shortness of Air., Disp: , Rfl:     clonazePAM (KlonoPIN) 1 MG tablet, Take 1 tablet by mouth At Night As Needed (insomnia)., Disp: , Rfl:     dilTIAZem CD (CARDIZEM CD) 240 MG 24 hr capsule, Take 1 capsule by mouth Daily., Disp: , Rfl:     Dupixent 300 MG/2ML solution pen-injector, Every 14 (Fourteen) Days., Disp: , Rfl:     EPINEPHrine (EPIPEN) 0.3 MG/0.3ML solution auto-injector injection, , Disp: , Rfl:     estradiol (ESTRACE) 0.1 MG/GM vaginal cream, Insert  into the vagina 2 (Two) Times a Week.  and , Disp: , Rfl:     ferrous sulfate 325 (65 FE) MG EC tablet, Take 1 tablet by mouth 3 (Three) Times a Week. M,W,F, Disp: , Rfl:     fluticasone (FLONASE) 50 MCG/ACT nasal spray, 1 spray into the nostril(s) as directed by  provider 2 (Two) Times a Day., Disp: , Rfl:     gabapentin (NEURONTIN) 600 MG tablet, Take 1 tablet by mouth 2 (Two) Times a Day., Disp: , Rfl:     ketoconazole (NIZORAL) 2 % cream, APPLY TOPICALLY TO THE AFFECTED AREA ON ABDOMEN TWICE DAILY AS NEEDED, Disp: , Rfl:     lansoprazole (PREVACID) 30 MG capsule, Take 1 capsule by mouth., Disp: , Rfl:     losartan (COZAAR) 100 MG tablet, Take 1 tablet by mouth Daily., Disp: , Rfl:     MAGNESIUM GLYCINATE PO, Take 200 mg by mouth Every Night., Disp: , Rfl:     metFORMIN ER (GLUCOPHAGE-XR) 500 MG 24 hr tablet, Take 3 tablets by mouth Daily., Disp: , Rfl:     methocarbamol (ROBAXIN) 750 MG tablet, Take 1 tablet by mouth 2 (Two) Times a Day As Needed for Muscle Spasms., Disp: 60 tablet, Rfl: 0    montelukast (SINGULAIR) 10 MG tablet, Take 1 tablet by mouth Every Night., Disp: , Rfl:     naloxone (NARCAN) 4 MG/0.1ML nasal spray, Call 911. Don't prime. [place] in 1 nostril for overdose. Repeat in 2-3 minutes in other nostril if no or minimal breathing/responsiveness., Disp: 2 each, Rfl: 0    omeprazole (priLOSEC) 40 MG capsule, Take 1 capsule by mouth Daily., Disp: , Rfl:     ondansetron ODT (ZOFRAN-ODT) 4 MG disintegrating tablet, Take 1 tablet by mouth As Needed., Disp: , Rfl:     pravastatin (PRAVACHOL) 40 MG tablet, Take 1 tablet by mouth Daily., Disp: , Rfl:     Tiotropium Bromide Monohydrate (SPIRIVA RESPIMAT) 1.25 MCG/ACT aerosol solution inhaler, Inhale 2 puffs Daily., Disp: , Rfl:     VITAMIN E 400 UNIT capsule, Take 2 capsules by mouth Daily., Disp: , Rfl:     Allergies:   Allergies   Allergen Reactions    Amlodipine Swelling     Swelling in feet    Dyazide [Triamterene-Hctz] Other (See Comments)     Hypercalcemia--pt was in hospital for 5 days     Levaquin [Levofloxacin] Other (See Comments)     Muscle/tendon pain    Adhesive Tape Rash     Does okay with paper tape      Dulaglutide Nausea Only       I reviewed the patient's chief complaint, history of present illness,  "review of systems, past medical history, surgical history, family history, social history, medications and allergy list.     Objective    Vital Signs:   Vitals:    07/08/24 1357   BP: 136/74   Weight: 103 kg (227 lb)   Height: 172.7 cm (68\")     Body mass index is 34.52 kg/m².    Ortho Exam:  right LE Foot and Ankle Exam:   Antalgic gait. Hindfoot alignment is neutral. Plantigrade foot.   Neurological exam of the superficial peroneal, deep peroneal, plantar, sural and saphenous nerves demonstrates intact sensation and normal motor function.   There is good perfusion to the toes.   The skin is intact throughout the foot and ankle without ulceration.   Range of motion of ankle, subtalar joint, midfoot and toes is within normal limits.   There is tenderness to palpation over the mid substance of the achilles tendon, palpable hypertrophic tendon at site of pain.      Results Review:   07/08/24 I have personally reviewed and interpreted the images from outside facility with the documented findings, MRI right ankle without contrast from October 31 reviewed showing tendinopathy of the Achilles tendon with interstitial tearing in the mid substance      Assessment / Plan    Assessment/Plan:   Diagnoses and all orders for this visit:    1. Tendonitis, Achilles, right (Primary)  -     Ambulatory Referral to Sports Medicine  -     Ambulatory Referral to Physical Therapy for Evaluation & Treatment      Discussed chronic right Achilles tendon pain which has been present for over a year causing pain that has progressed (a chronic illness with exacerbation). Decision regarding surgical intervention considered. Patient is not a candidate due to trial of nonoperative management.  We will treat achilles pain/injury as mid-substance Achilles tendonitis which is generally thought of as an overuse syndrome or due to chronic aging change.  I explained the \"bump\" in the tendon will be there permanently, the goal of treatment is to have the " pain resolve.    I explained that the Sports Medicine research literature shows that a 12 week course of physical therapy, stretching, and night splinting will allow 85% of patients to get back to their prior level of activity. I explained the risk of rupture is 10% or less, we discussed activity. I would also recommend a trial of Voltaren gel. I explained that surgery is often ineffective for this problem.    Patient feels like she is exhausted a lot of her nonoperative treatment options including wearing a previous short leg cast.  Following our discussion patient did states she would like to return to physical therapy and a prescription was provided.  I provided patient with a handout showing her the stretching exercises with instructions on how often to do them.  I also made a referral to sports medicine for evaluation to see if patient is a candidate for Tenex.  Will plan to see patient back on an as-needed basis.  Was a pleasure seeing her today.       Follow Up:   Return if symptoms worsen or fail to improve.      Reji Stover MD  [facility]  Answers submitted by the patient for this visit:  Other (Submitted on 7/7/2024)  Please describe your symptoms.: R. Achilles PAIN!!  Have you had these symptoms before?: Yes  How long have you been having these symptoms?: Greater than 2 weeks  Please describe any probable cause for these symptoms. : Levaquin prescribed many times.  Primary Reason for Visit (Submitted on 7/7/2024)  What is the primary reason for your visit?: Other

## 2024-07-08 NOTE — PATIENT INSTRUCTIONS
Achilles Tendinitis (Mid-substance or Non-insertional)    What is Achilles tendinitis?  The Achilles tendon is a large tendon that runs along the backside of the ankle  The Achilles tendon connects the powerful calf muscles (gastrocnemius and soleus) to the heel bone (calcaneus)  When the calf muscles contract, the Achilles tendon pulls and elevates the back of the heel bone. This causes ankle plantarflexion and generates push-off strength for walking, running and jumping.  The terms “midsubstance” and “non-insertional” refer to a specific region of the Achilles tendon between 2 and 6 centimeters proximal (towards the knee) from where the tendon attaches to the heel bone (calcaneus)  Achilles tendinitis in this location is different from insertional Achilles tendinopathy which occurs more distally (towards the heel) near the site of the heel bone attachment. Insertional Achilles tendinopathy is not the topic of this material.  Achilles tendinitis is common. It causes pain, swelling and stiffness along the tendon near the back of the ankle  The pain can be worse in the mornings or after a period of inactivity  The pain can also become worse during physical activity and improved by rest   Some patients may develop thickening of the tendon compared to the other side       What causes Achilles tendinitis?  Achilles tendinitis is a degenerative condition involving the midsubstance of the Achilles tendon. It is also commonly called Achilles tendinosis which implies a degenerative problem rather than an inflammatory problem.    The term degenerative is used to describe conditions that are caused by repetitive “wear and tear” or “overuse”  For the midsubstance of the Achilles tendon, these small “wear and tear” injuries tend to accumulate in an area that has relatively decreased blood supply known as the watershed region. The capacity for the tendon to heal itself is diminished in this region  Achilles tendinitis is also  commonly referred to as an “overuse” injury. It can affect most age groups but is common in young, active patients, especially runners.   Achilles tendinitis symptoms may be brought on by a sudden increase in the amount or intensity of exercise activity  Tight calf muscles and poor flexibility may put extra strain on the Achilles tendon and could contribute to the development of Achilles tendinitis symptoms    How is Achilles tendinitis diagnosed?  Achilles tendinitis can be diagnosed with a clinical examination paying close attention to the nature of the Achilles tendon and the exact location of pain and swelling or thickening   Evaluation with x-rays is still routine. Some patients with severe or longstanding Achilles tendinitis may have areas of calcification (bone formation) within the tendon. This can be seen on x-ray.  Ultrasound can be helpful in evaluating the extent of diseased tendon. Special types of ultrasound may also be used during some forms of treatment for Achilles tendinitis.  A magnetic resonance imaging (MRI) study may be obtained as well. This is not typically ordered at the time of diagnosis but is reserved for later. The details on an MRI may help explain why a patient may not be improving with traditional nonsurgical treatment.    MRI will often show fluid signal within the substance of the tendon as well as tendon enlargement         What is the treatment for Achilles tendinitis?  Most cases of non-insertional or midsubstance Achilles tendinitis are treated successfully without surgery. In fact, it is rare to perform surgery for this condition.   Because of the high rates of success with non-surgical treatment, most individuals are treated conservatively for 6-12 months before surgical intervention would be considered.   Most patients improve with a dedicated period of calf muscle and Achilles tendon stretching as well as a very specific form of calf muscle strengthening  It is important to  know that recovery takes time and that the below non-surgical treatment strategies must be consistently applied for multiple weeks.     Temporary immobilization:  Some patients present with an Achilles tendon that is very acutely inflamed and exquisitely tender to the touch. It is challenging even for these patients to walk normally and they do so with a limp. These patients often benefit from a period of strict rest, such as in a walking boot, to really relax the tendon tissue.  This can help jumpstart the healing process and calm down the aggravated tissue. For these cases a boot may be recommended for 1-2 weeks or until the severe symptoms have passed.  A boot is NOT the definitive treatment, however. This is only a temporary strategy to improve symptoms so that a patient is able to tolerate regular walking and eventually, therapy exercises.           Activity modification:  Avoid activities that aggravate pain in the back of the ankle along the Achilles tendon  This seems so simple but it is often the most challenging part of the recovery. Many patients with Achilles tendinitis are active and want to be participating in sport and other recreational activities. In order to fully recover, a dedicated period of avoidance of these types of activities is necessary.   Common activities that need to be put on hold include: running, jumping, power walking and hiking. These are higher impact.   Some patients are able to continue biking, cycling or using an elliptical throughout their recovery from Achilles tendinitis without aggravation of symptoms. This is probably because these activities are low impact and put less strain on the Achilles tendon.   The general rule is that if an activity causes pain along the Achilles tendon, then it should be avoided until it does not.   The tendon needs to heal before it can be retrained!  Returning to a desired higher impact activity too soon is a very common mistake in patients  "with Achilles tendinitis, especially runners.     Medications:  Non-steroidal anti-inflammatory medications (NSAIDs) may be tried.   There are over-the-counter and prescription options. These are typically taken by mouth, but there are also topical formulations.   Achilles tendinitis is a degenerative problem. It is not really an inflammatory condition and so the use of these medications in isolation will rarely solve the underlying problem. These medications may be helpful for their analgesic (pain-relieving) function while other, more effective strategies like stretching and strengthening, are employed.   Some patients have medical conditions that limit their ability to take NSAIDs.  Ice is okay    Night splinting:  Many patients share that pain is the worst in the mornings with the first few steps of the day. This is probably due to the way the ankle rests in plantarflexion (toes pointed down) during sleep. A plantarflexed ankle leads to a contracted or tight position for the calf and plantar fascia tissue. There is evidence to suggest that keeping the ankle in a neutral (90-degree) position overnight can help alleviate some of the morning symptoms. This can be accomplished with a night splint. These can be searched for and purchased on [employer] for $20-40.     Go to [employer].com and type in \"Plantar Fasciitis Night Splint\"    Calf muscle and Achilles tendon/heel cord stretching exercises:  Stretching is the most important part of the recovery program for achilles tendinitis  Improving the flexibility of the calf muscle and Achilles tendon complex leads to a decrease in the amount of resting tension or strain experienced at the tendon midsubstance  There are several stretching exercises that can be tried. In general, it is advisable to pick 2 or 3 of them to perform each day. It is okay to change things up and vary the types of stretches.  The important aspect of stretching is doing it consistently. One week will not " be enough. Daily stretching for several months is more likely to lead to symptomatic relief and clinical improvement.   Patients can often perform these stretches by themselves as part of a home exercise program however a patient may benefit from formal guidance from a physical therapist in order to do these stretches consistently        Eccentric strengthening exercises (also known as Heel Drops):  This is an important part to recovery  Studies have shown that the Achilles tendon heals more appropriately when it is loaded eccentrically and this can help resolve symptoms in patients with insertional Achilles tendinopathy  The term “eccentric” means that the muscle is elongating (getting longer) while still gordo  These exercises are known as heel drops and can be part of a home exercise program or can be guided by a physical therapist.   Do not confuse this exercise with calf raises or heel rises. These are also forms of calf strengthening exercises, but they have not been shown to help with Achilles tendon healing. Performing a bunch of calf raises, instead of controlled heel drops, may be counterproductive and could prolong symptoms.   See Details Below      Calf Stretches/Exercises:  YOU MUST DO:   10 REPETITIONS          5-6 TIMES PER DAY            FOR 4 MONTHS    Heel drops  This option is a strengthening exercise in addition to a stretching exercise  Stand on the ball of your foot while positioned on the edge of a ledge, such as a stair   Slowly lower the heels down below the ledge  The lowering of the heels should be controlled and deliberate and should take about 10 seconds. This is the strengthening portion of this exercise.   When the heels have dropped completely, this position can be held longer to get a good stretch.   Heel drops can be done with both legs at the same time or one leg at a time for a more concentrated effort.   This can also be done with the knees straight (stretching the  gastrocnemius) or with the knees bent (to stretch the soleus).         2)   Straight knee standing stretch  Stand facing a wall with your unaffected leg forward with a slight bend at the knee. Your affected leg behind you with the knee straight or extended.   The heels should be flat on the floor.   Press your hips forward toward the wall. Try not to arch your back.  You should feel a pulling or stretching sensation in the back of the leg along the calf.   Hold this stretch for 30 seconds and then relax for 30 seconds. This is one repetition.  You should perform 10 repetitions to make a set and you should perform 2-3 sets per day.   Because the knee is kept straight, this is a good stretch for the gastrocnemius muscle.         3)   Bent knee standing stretch  Stand facing a wall with your unaffected leg forward with a slight bend at the knee. Your affected leg is behind you with the knee bent or flexed.  The heels should be flat on the floor  Press your hips forward toward the wall and try not to arch your back.  Hold this stretch for 30 seconds and then relax for 30 seconds. This is one repetition.  You should perform 10 repetitions to make a set and you should perform 2-3 sets per day.   Because the knee is bent, this is a good stretch for the soleus muscle.     2    4)   Seated towel stretch  Sit on the floor or in bed with both legs out in front of you.  Loop a towel or a belt around the ball of your affected foot and grasp the ends of the towel in your hands.  Keep your affected knee straight or extended and pull the towel toward you.  Hold for 30 seconds and then relax for 30 seconds. This is one repetition.        5)   Wall stretch  Stand about two feet away from a wall. Place the ball of your affected foot against the wall while the heel remains on the ground.   Slowly and gently lean into the wall with your hips while keeping your knee straight.  Hold this for about 30 seconds and then relax. This is one  repetition.         6)   Downward dog yoga stretch  Get down on all fours with your hands positioned under your shoulders on the floor.  Walk your hands forward slightly on the floor.   Spread your fingers apart to allow for a broad base of support.   Push your hips up toward the ceiling and tighten your abdominal muscles.   Keep your heels on the ground and gently try to straighten your knees.  You should feel a pull or stretching sensation at the back of both legs along the calf muscles.  Hold this stretch for about 15-30 seconds and then relax. This is a repetition.   5    7)   Foam roller stretch  Sit on the floor with your legs stretched out in front of you.   Place the foam roller under the lower half of your affected leg.   Cross the other leg over the affected leg.   Push up with your arms so that your bottom is off the floor  Slowly roll yourself over the foam roller back and forth working your way up the calf muscle toward the knee. It is not necessary to roll across the back of the knee.   Try performing this with your toes pointing inward and outward to get a slightly different stretch.  Roll the affected side for about 30 seconds and then relax.       RESOURCES  Some of the above material was adapted from the [employer] website. For more information, please visit [email or web address]. Search “achilles tendonitis”.   Some images were obtained from the American Academy [employer]'s patient education resource called OrthoInfo. Additional information can be found at www.orthoinfo.org. Search “achilles tendinitis”.   Shin RS, Russell C, Augusta J, Paige N, Alexandre J, Luis E P, Terrance J, Satya ML; [employer]. Effect of Platelet-Rich Plasma Injection vs Sham Injection on Tendon Dysfunction in Patients With Chronic Midportion Achilles Tendinopathy: A Randomized Clinical Trial. KRISTIN. 2021 Jul 13;326(2):137-144.  Cierra Stuart, Missy JAMISON, Juan REYES. Short- and Intermediate-Term  Results of Extracorporeal Shockwave Therapy for Noninsertional Achilles Tendinopathy. Foot Ankle Int. 2021 Jun;42(6):788-797.    What Can Be Treated with the Tenex Procedure?  The Tenex procedure is used to treat chronic tendon pain from tendinosis (chronic damage to a tendon) or tendinitis (acute swelling of a tendon).    The procedure is  most effective on tendons in the elbows, shoulders, hips, knees, and ankles -- especially when you have:  Plantar fasciitis  Tennis elbow  Golfer’s elbow  Jumper’s knee  Achilles’ tendinosis    How Is the Tenex Procedure Done?  Your doctor may recommend that you stop taking anti-inflammatory drugs or blood thinners one week before the procedure.     Depending on how much scar tissue needs to be removed, the procedure takes 10 to 30 minutes or less.    The Tenex procedure starts with the physician using real-time ultrasound to locate the scar tissue. After applying numbing medication on the area or areas, the physician  will use the ultrasonic needle to target, break up and remove the scar tissue. The healthy tissue is unharmed. You’ll need a bandage and you can go home.    You'll want to rest the area for a few days as you may have some mild soreness in the treated area for up to 1 week.    Most patients recover in 4 to 6 weeks after the procedure, but it may take up to 12 weeks for you to get back to strenuous activities or sports. Physical therapy may be recommended to improve the success of the procedure. Most patients can use over-the-counter pain relievers to reduce the pain after the procedure.    Your physician will schedule a follow-up appointment 2 weeks after the procedure to see how your recovery is progressing.    Only one treatment is needed for most patients:    What Are the Side Effects of the Tenex Procedure?  Side effects of the Tenex procedure may include pain at the incision site, soreness, or some swelling for a few days after the procedure.    Only 0.001% of  people who got the Tenex procedure have reported complications.    [email or web address]

## 2024-07-18 ENCOUNTER — TREATMENT (OUTPATIENT)
Dept: PHYSICAL THERAPY | Facility: CLINIC | Age: 69
End: 2024-07-18
Payer: MEDICARE

## 2024-07-18 DIAGNOSIS — M25.562 CHRONIC PAIN OF BOTH KNEES: Primary | ICD-10-CM

## 2024-07-18 DIAGNOSIS — G89.29 CHRONIC PAIN OF BOTH KNEES: Primary | ICD-10-CM

## 2024-07-18 DIAGNOSIS — M25.571 ACUTE RIGHT ANKLE PAIN: Primary | ICD-10-CM

## 2024-07-18 DIAGNOSIS — M25.561 CHRONIC PAIN OF BOTH KNEES: Primary | ICD-10-CM

## 2024-07-18 NOTE — PROGRESS NOTES
Physical Therapy Initial Evaluation and Plan of Care    [facility]  [address]  [place] [address]    Patient: Yanira Bernal   : 1955  Diagnosis/ICD-10 Code:  Chronic pain of both knees [M25.561, M25.562, G89.29]  Referring practitioner: Carlos Crisostomo MD  Date of Initial Visit: 2024  Today's Date: 2024  Patient seen for 1 sessions           Subjective Questionnaire: LEFS:       Subjective Evaluation    History of Present Illness  Mechanism of injury: Bilateral knee pain. Patient had L4/5 laminectomy  and started rehab about 1 month ago. She reports PT was aggravating her R Achilles tendonitis and B knee OA. Since her back surgery, B LE tingling and L LE pain have resolved, and her back gets tired but does not hurt.  Patient feels like recumbent bike and other PT activities exacerbated her knee pain.  Standing and ambulation tolerance, however, has been limited by both right heel pain and bilateral knee pain.    CLOF: walking limited to 10 min, standing limited to 10 min      Patient Occupation: retired RN Pain  Current pain ratin  At best pain ratin  Pain scale at highest: R heel: 9, L knee: 7.  Alleviating factors: ice, wearing heels.    Social Support  Lives in: multiple-level home    Patient Goals  Patient goals for therapy: decreased pain, independence with ADLs/IADLs, return to sport/leisure activities, increased strength and increased motion             Objective          Palpation   Left   Hypertonic in the iliopsoas, medial gastrocnemius and quadratus lumborum.   Tenderness of the anterior tibialis, iliopsoas, medial gastrocnemius, posterior tibialis and quadratus lumborum.     Right   Hypertonic in the iliopsoas, medial gastrocnemius, piriformis and quadratus lumborum. Tenderness of the anterior tibialis, iliopsoas, medial gastrocnemius, piriformis, posterior tibialis and quadratus lumborum.     Additional Palpation  Details  Joint mobility: guarding present but no pain    Tenderness     Left Hip   Tenderness in the greater trochanter.     Right Hip   Tenderness in the greater trochanter.   Left Knee   Tenderness in the ITB, LCL (distal), LCL (proximal), MCL (distal), MCL (proximal), medial joint line and pes anserinus.     Right Knee   Tenderness in the ITB.   Left Ankle/Foot   Tenderness in the posterior tibial tendon and proximal Achilles. No tenderness in the Achilles insertion.     Right Ankle/Foot   Tenderness in the Achilles insertion, posterior tibial tendon and proximal Achilles.     Active Range of Motion   Left Knee   Flexion: 126 degrees   Extension: 0 degrees     Right Knee   Flexion: 124 degrees   Extension: 0 degrees   Left Ankle/Foot   Plantar flexion: WFL  Inversion: WFL  Eversion: WFL  Great toe flexion: WFL  Great toe extension: WFL    Right Ankle/Foot   Plantar flexion: WFL  Inversion: WFL  Eversion: WFL  Great toe flexion: WFL  Great toe extension: WFL    Additional Active Range of Motion Details  Flexion: touches mid shin, curve reversal present  Extension: 25%  DF: lacking 3 deg on L and 10 deg on R    Passive Range of Motion   Left Hip   Flexion: WFL  Extension: 0 degrees   External rotation (90/90): 45 degrees   Internal rotation (90/90): 30 degrees     Right Hip   Flexion: WFL  Extension: 0 degrees   External rotation (90/90): 30 degrees   Internal rotation (90/90): 30 degrees   Left Knee   Flexion: 131 degrees with pain  Extension: WFL    Right Knee   Flexion: 126 degrees   Extension: WFL and with pain    Patellar Mobility   Left Knee Patellar tendons within functional limits include the superior and inferior. Hypomobile in the left medial and left lateral patellar tendon(s).     Right Knee Patellar tendons within functional limits include the superior and inferior. Hypomobile in the medial and lateral patellar tendon(s).     Joint Play   Left Ankle/Foot  Joints within functional limits are the  subtalar joint, midfoot and forefoot. Hypomobile in the fibular head, distal tibiofibular joint and talocrural joint.     Right Ankle/Foot  Joints within functional limits are the subtalar joint, midfoot and forefoot. Hypomobile in the fibular head, distal tibiofibular joint and talocrural joint.     Strength/Myotome Testing     Left Hip   Planes of Motion   Flexion: 4  Extension: 4-  Abduction: 4-  External rotation: 4-    Right Hip   Planes of Motion   Flexion: 4+  Extension: 4-  Abduction: 4-  External rotation: 4-    Left Knee   Flexion: 4  Extension: 4+  Quadriceps contraction: good    Right Knee   Flexion: 4  Extension: 4  Quadriceps contraction: good    Left Ankle/Foot   Dorsiflexion: 4  Plantar flexion: 4+  Inversion: 4+  Eversion: 4+  Great toe flexion: 4+  Great toe extension: 4+    Right Ankle/Foot   Dorsiflexion: 4+  Plantar flexion: 4+  Inversion: 4+  Eversion: 4+  Great toe flexion: 4+  Great toe extension: 4+    Tests     Left Hip   Negative FADIR and scour.     Right Hip   Negative FADIR and scour.     Left Knee   Negative valgus stress test at 0 degrees, valgus stress test at 30 degrees, varus stress test at 0 degrees and varus stress test at 30 degrees.     Right Knee   Negative valgus stress test at 0 degrees, valgus stress test at 30 degrees, varus stress test at 0 degrees and varus stress test at 30 degrees.     Ambulation     Comments   Decreased push off through great toes, especially R    Functional Assessment     Single Leg Stance   Left: 5 seconds  Right: 4 seconds          Assessment & Plan       Assessment  Impairments: abnormal gait, abnormal muscle firing, abnormal or restricted ROM, activity intolerance, impaired balance, impaired physical strength, lacks appropriate home exercise program, pain with function, safety issue and weight-bearing intolerance   Functional limitations: carrying objects, walking, uncomfortable because of pain, standing and stooping   Assessment details: Patient  is a 69-year-old female who presents with chronic, recurrent bilateral knee pain.  Symptoms recurred during rehab for her low back, during which she uses a recumbent bike.  Symptoms limit standing and walking.  Knee ROM is WNL, though PROM right knee extension and left knee flexion are painful.  Left medial knee structures are TTP, reproducing her main complaint of pain.  Right knee is somewhat weaker than left.  Gross hip weakness is present.  Barriers to therapy: multiple body regions, chronicity, comorbidities  Prognosis: good    Goals  Plan Goals: Short term goals (4 weeks)  1. Patient to be compliant with initial HEP.  2. Patient to improve LEFS to greater than or equal to 30/80.    Long Term goals (8 weeks)  1. Patient to demonstrate pain free and normal strength with MMTs.  2. Patient to ascend and descend eight 8 inch steps reciprocally with one handrail with minimal to no antalgia or difficulty.  3. Patient to demonstrate independence with home exercise program.  4. Patient to improve LEFS to greater than or equal to 40/80.       Plan  Therapy options: will be seen for skilled therapy services  Planned modality interventions: thermotherapy (hydrocollator packs), TENS, cryotherapy and dry needling  Planned therapy interventions: manual therapy, neuromuscular re-education, soft tissue mobilization, strengthening, stretching, therapeutic activities, joint mobilization, home exercise program, functional ROM exercises, balance/weight-bearing training, abdominal trunk stabilization, postural training, motor coordination training, spinal/joint mobilization, ADL retraining, transfer training, body mechanics training and gait training  Frequency: 2x week  Duration in weeks: 8  Treatment plan discussed with: patient        Timed:  Manual Therapy:    0     mins  21120;  Therapeutic Exercise:    10     mins  51462;     Neuromuscular Miryam:    0    mins  41712;    Therapeutic Activity:     15     mins  04962;     Gait  Trainin     mins  26280;     Ultrasound:     0     mins  39937;    Electrical Stimulation:    0     mins  89898 ( );    Untimed:  Electrical Stimulation:    0     mins  61063 ( );  Mechanical Traction:    0     mins  19742;   Dry Needlin     mins      Timed Treatment:   25   mins   Total Treatment:     40   mins    PT SIGNATURE: Janes ELIZABETH Kiser   License Number: [id number]  DATE TREATMENT INITIATED: 2024    Initial Certification  Certification Period: 10/16/2024  I certify that the therapy services are furnished while this patient is under my care.  The services outlined above are required by this patient, and will be reviewed every 90 days.     PHYSICIAN: aCrlos Crisostomo MD      DATE:     Please sign and return via fax to [phone].. Thank you, [facility].

## 2024-07-18 NOTE — PROGRESS NOTES
Physical Therapy Initial Evaluation and Plan of Care    [facility]  [address]  [place] [address]    Patient: Yanira Bernal   : 1955  Diagnosis/ICD-10 Code:  Acute right ankle pain [M25.571]  Referring practitioner: Reji Stover MD  Date of Initial Visit: 2024  Today's Date: 2024  Patient seen for 1 sessions           Subjective Questionnaire: LEFS:       Subjective Evaluation    History of Present Illness  Mechanism of injury: R Achilles pain. Patient had L4/5 laminectomy  and started rehab about 1 month ago. She reports PT was aggravating her R Achilles tendonitis and L knee OA. Since her back surgery, B LE tingling and L LE pain have resolved, and her back gets tired but does not hurt. She is only able to wear open heeled shoes due to sensitivity of her R heel. Her R 5th digit hurts along with her heel. Specifically, she feels like calf raises, calf stretches, and recumbent bike aggravated her R heel.     CLOF: walking limited to 10 min, standing limited to 10 min      Patient Occupation: retired RN Pain  Current pain ratin  At best pain ratin  Pain scale at highest: R heel: 9, L knee: 7.  Alleviating factors: ice, wearing heels.    Social Support  Lives in: multiple-level home    Patient Goals  Patient goals for therapy: decreased pain, independence with ADLs/IADLs, return to sport/leisure activities, increased strength and increased motion           Objective          Palpation   Left   Hypertonic in the iliopsoas, medial gastrocnemius and quadratus lumborum.   Tenderness of the anterior tibialis, iliopsoas, medial gastrocnemius, posterior tibialis and quadratus lumborum.     Right   Hypertonic in the iliopsoas, medial gastrocnemius, piriformis and quadratus lumborum. Tenderness of the anterior tibialis, iliopsoas, medial gastrocnemius, piriformis, posterior tibialis and quadratus lumborum.     Additional Palpation  Details  Joint mobility: guarding present but no pain    Tenderness     Left Hip   Tenderness in the greater trochanter.     Right Hip   Tenderness in the greater trochanter.   Left Knee   Tenderness in the ITB, LCL (distal), LCL (proximal), MCL (distal), MCL (proximal), medial joint line and pes anserinus.     Right Knee   Tenderness in the ITB.   Left Ankle/Foot   Tenderness in the posterior tibial tendon and proximal Achilles. No tenderness in the Achilles insertion.     Right Ankle/Foot   Tenderness in the Achilles insertion, posterior tibial tendon and proximal Achilles.     Active Range of Motion   Left Knee   Flexion: 126 degrees   Extension: 0 degrees     Right Knee   Flexion: 124 degrees   Extension: 0 degrees   Left Ankle/Foot   Plantar flexion: WFL  Inversion: WFL  Eversion: WFL  Great toe flexion: WFL  Great toe extension: WFL    Right Ankle/Foot   Plantar flexion: WFL  Inversion: WFL  Eversion: WFL  Great toe flexion: WFL  Great toe extension: WFL    Additional Active Range of Motion Details  Flexion: touches mid shin, curve reversal present  Extension: 25%  DF: lacking 3 deg on L and 10 deg on R    Passive Range of Motion   Left Hip   Flexion: WFL  Extension: 0 degrees   External rotation (90/90): 45 degrees   Internal rotation (90/90): 30 degrees     Right Hip   Flexion: WFL  Extension: 0 degrees   External rotation (90/90): 30 degrees   Internal rotation (90/90): 30 degrees   Left Knee   Flexion: 131 degrees with pain  Extension: WFL    Right Knee   Flexion: 126 degrees   Extension: WFL and with pain    Patellar Mobility   Left Knee Patellar tendons within functional limits include the superior and inferior. Hypomobile in the left medial and left lateral patellar tendon(s).     Right Knee Patellar tendons within functional limits include the superior and inferior. Hypomobile in the medial and lateral patellar tendon(s).     Joint Play   Left Ankle/Foot  Joints within functional limits are the  subtalar joint, midfoot and forefoot. Hypomobile in the fibular head, distal tibiofibular joint and talocrural joint.     Right Ankle/Foot  Joints within functional limits are the subtalar joint, midfoot and forefoot. Hypomobile in the fibular head, distal tibiofibular joint and talocrural joint.     Strength/Myotome Testing     Left Hip   Planes of Motion   Flexion: 4  Extension: 4-  Abduction: 4-  External rotation: 4-    Right Hip   Planes of Motion   Flexion: 4+  Extension: 4-  Abduction: 4-  External rotation: 4-    Left Knee   Flexion: 4  Extension: 4+  Quadriceps contraction: good    Right Knee   Flexion: 4  Extension: 4  Quadriceps contraction: good    Left Ankle/Foot   Dorsiflexion: 4  Plantar flexion: 4+  Inversion: 4+  Eversion: 4+  Great toe flexion: 4+  Great toe extension: 4+    Right Ankle/Foot   Dorsiflexion: 4+  Plantar flexion: 4+  Inversion: 4+  Eversion: 4+  Great toe flexion: 4+  Great toe extension: 4+    Tests     Left Hip   Negative FADIR and scour.     Right Hip   Negative FADIR and scour.     Left Knee   Negative valgus stress test at 0 degrees, valgus stress test at 30 degrees, varus stress test at 0 degrees and varus stress test at 30 degrees.     Right Knee   Negative valgus stress test at 0 degrees, valgus stress test at 30 degrees, varus stress test at 0 degrees and varus stress test at 30 degrees.     Ambulation     Comments   Decreased push off through great toes, especially R    Functional Assessment     Single Leg Stance   Left: 5 seconds  Right: 4 seconds          Assessment & Plan       Assessment  Impairments: abnormal coordination, abnormal gait, abnormal or restricted ROM, activity intolerance, impaired balance, impaired physical strength, lacks appropriate home exercise program, pain with function, safety issue and weight-bearing intolerance   Functional limitations: carrying objects, walking, uncomfortable because of pain, standing and stooping   Assessment details: Patient  is a 69-year-old female who presents with chronic, recurrent right posterior heel pain.  Her symptoms recurred during rehab of her low back and she completed stationary bike and other standing activities after which she experienced severe increase in right heel pain.  She demonstrates decreased bilateral ankle DF ROM and talocrural hypomobility.  Palpation of right Achilles tendon and insertion reproduce her main complaint of pain, gastroc, anterior tibialis, and posterior tibialis are all also TTP.  Patient advised to use heel lifts temporarily to reduce Achilles strain.  Will introduce plantar flexor eccentric loading once irritability of her condition decreases.  Barriers to therapy: will address multiple body regions in PT, comorbidities, chronicity  Prognosis: good    Goals  Plan Goals: SHORT TERM GOALS:     4 weeks  1. Pt compliant with initial HEP.   2. Pt to demonstrate ability to perform single leg heel raise on the R without increase in pain.     LONG TERM GOALS:   8 weeks  1. Patient to demonstrate B ankle AROM dorsiflexion to at least 0 deg.  2. Pt to demonstrate ability to perform SLS on each leg for 15 seconds without LOB or increased pain .  3. Patient to demonstrate no reactivity with resisted MMT all planes.  4. Patient to improve LEFS score to at least 40/80.     Plan  Therapy options: will be seen for skilled therapy services  Planned modality interventions: dry needling, electrical stimulation/Russian stimulation, cryotherapy, TENS and thermotherapy (hydrocollator packs)  Planned therapy interventions: manual therapy, neuromuscular re-education, motor coordination training, soft tissue mobilization, strengthening, joint mobilization, therapeutic activities, stretching, home exercise program, gait training, functional ROM exercises, balance/weight-bearing training, fine motor coordination training, postural training, spinal/joint mobilization, abdominal trunk stabilization, ADL retraining and  flexibility  Frequency: 1x month  Duration in weeks: 8  Treatment plan discussed with: patient        Timed:  Manual Therapy:    8     mins  90480;  Therapeutic Exercise:    10     mins  55278;     Neuromuscular Miryam:    0    mins  98746;    Therapeutic Activity:     20     mins  50692;     Gait Trainin     mins  28774;     Ultrasound:     0     mins  21634;    Electrical Stimulation:    0     mins  25529 ( );    Untimed:  Electrical Stimulation:    0     mins  63710 ( );  Mechanical Traction:    0     mins  28768;   Dry Needlin     mins      Timed Treatment:   38   mins   Total Treatment:     55   mins    PT SIGNATURE: Janes Kiser PT   License Number: [id number]  DATE TREATMENT INITIATED: 2024    Initial Certification  Certification Period: 10/16/2024  I certify that the therapy services are furnished while this patient is under my care.  The services outlined above are required by this patient, and will be reviewed every 90 days.     PHYSICIAN: Reji Stover MD      DATE:     Please sign and return via fax to [phone].. Thank you, [facility].

## 2024-07-23 ENCOUNTER — TREATMENT (OUTPATIENT)
Dept: PHYSICAL THERAPY | Facility: CLINIC | Age: 69
End: 2024-07-23
Payer: MEDICARE

## 2024-07-23 DIAGNOSIS — M25.561 CHRONIC PAIN OF BOTH KNEES: Primary | ICD-10-CM

## 2024-07-23 DIAGNOSIS — G89.29 CHRONIC PAIN OF BOTH KNEES: Primary | ICD-10-CM

## 2024-07-23 DIAGNOSIS — M25.562 CHRONIC PAIN OF BOTH KNEES: Primary | ICD-10-CM

## 2024-07-23 DIAGNOSIS — M25.571 ACUTE RIGHT ANKLE PAIN: ICD-10-CM

## 2024-07-23 NOTE — PROGRESS NOTES
Physical Therapy Daily Progress Note    [facility]  [address]  [place] [address]      Patient: Yanira Bernal   : 1955  Diagnosis/ICD-10 Code:  Chronic pain of both knees [M25.561, M25.562, G89.29]  Referring practitioner: Carlos Crisostomo MD  Date of Initial Visit: Type: THERAPY  Noted: 2024  Today's Date: 2024  Patient seen for 2 sessions           Subjective   Patient reports awakening due to B LE tingling this morning for the first time since surgery, which she attributes to one of her exercises.    Objective   See Exercise, Manual, and Modality Logs for complete treatment.       Assessment/Plan  Corrected the exercise she felt led to LE tingling, which prevented her from hyperextending her lumbar spine during the exercise. Reviewed previous HEP from old rehab, adding in bridging and PPT to facilitate lumbopelvic control and strength.    Added hip strengthening and lumbar mobility exercises without pain. Added heel lifts into patient's shoes, with no increased pain but reduced balance noted.        Timed:  Manual Therapy:    0     mins  97568;  Therapeutic Exercise:    19     mins  19100;     Neuromuscular Miryam:   25    mins  07701;    Therapeutic Activity:     10     mins  08905;     Gait Trainin     mins  03112;     Ultrasound:     0     mins  98829;    Electrical Stimulation:    0     mins  52044 ( );    Untimed:  Electrical Stimulation:    0     mins  67517 ( );  Mechanical Traction:    0     mins  93881;   Dry Needlin     mins     Timed Treatment:   54   mins   Total Treatment:     54   mins    Janes Kiser PT  Physical Therapist

## 2024-08-02 ENCOUNTER — TREATMENT (OUTPATIENT)
Dept: PHYSICAL THERAPY | Facility: CLINIC | Age: 69
End: 2024-08-02
Payer: MEDICARE

## 2024-08-02 DIAGNOSIS — M25.561 CHRONIC PAIN OF BOTH KNEES: Primary | ICD-10-CM

## 2024-08-02 DIAGNOSIS — G89.29 CHRONIC PAIN OF BOTH KNEES: Primary | ICD-10-CM

## 2024-08-02 DIAGNOSIS — M25.562 CHRONIC PAIN OF BOTH KNEES: Primary | ICD-10-CM

## 2024-08-02 DIAGNOSIS — M25.571 ACUTE RIGHT ANKLE PAIN: ICD-10-CM

## 2024-08-02 NOTE — PROGRESS NOTES
Physical Therapy Daily Progress Note    [facility]  [address]  [place] [address]      Patient: Yanira Bernal   : 1955  Diagnosis/ICD-10 Code:  Chronic pain of both knees [M25.561, M25.562, G89.29]  Referring practitioner: Carlos rCisostomo MD  Date of Initial Visit: Type: THERAPY  Noted: 2024  Today's Date: 2024  Patient seen for 3 sessions           Subjective   Patient reports her knees and Achilles tendons are feeling good but she has new pain between her shoulder blades, especially at night. It feels like bone pain per patient.    Objective   See Exercise, Manual, and Modality Logs for complete treatment.       Assessment/Plan  Patient's mid back pain seems secondary to thoracic spine joint pain.  It was fairly sensitive today, with moderate increase in baseline pain with mobility activities.  Patient encouraged to continue mobility interventions for this area, but to avoid pushing through pain.    Progressed posterior chain strengthening to improve gluteal and multifidus activation without pain.        Timed:  Manual Therapy:    7     mins  02516;  Therapeutic Exercise:    21     mins  24231;     Neuromuscular Miryam:   10    mins  07696;    Therapeutic Activity:     0     mins  66225;     Gait Trainin     mins  97650;     Ultrasound:     0     mins  10002;    Electrical Stimulation:    0     mins  59847 ( );    Untimed:  Electrical Stimulation:    0     mins  58240 ( );  Mechanical Traction:    0     mins  64491;   Dry Needlin     mins     Timed Treatment:   38   mins   Total Treatment:     45   mins    Janes Kiser PT  Physical Therapist

## 2024-08-14 ENCOUNTER — OFFICE VISIT (OUTPATIENT)
Age: 69
End: 2024-08-14
Payer: MEDICARE

## 2024-08-14 ENCOUNTER — TREATMENT (OUTPATIENT)
Dept: PHYSICAL THERAPY | Facility: CLINIC | Age: 69
End: 2024-08-14
Payer: MEDICARE

## 2024-08-14 VITALS
HEIGHT: 68 IN | BODY MASS INDEX: 34.53 KG/M2 | WEIGHT: 227.8 LBS | DIASTOLIC BLOOD PRESSURE: 92 MMHG | SYSTOLIC BLOOD PRESSURE: 148 MMHG

## 2024-08-14 DIAGNOSIS — M17.12 PRIMARY OSTEOARTHRITIS OF LEFT KNEE: Primary | ICD-10-CM

## 2024-08-14 DIAGNOSIS — G89.29 CHRONIC PAIN OF BOTH KNEES: Primary | ICD-10-CM

## 2024-08-14 DIAGNOSIS — M25.562 CHRONIC PAIN OF BOTH KNEES: Primary | ICD-10-CM

## 2024-08-14 DIAGNOSIS — M25.561 CHRONIC PAIN OF BOTH KNEES: Primary | ICD-10-CM

## 2024-08-14 DIAGNOSIS — M25.571 ACUTE RIGHT ANKLE PAIN: ICD-10-CM

## 2024-08-14 PROCEDURE — 1159F MED LIST DOCD IN RCRD: CPT | Performed by: ORTHOPAEDIC SURGERY

## 2024-08-14 PROCEDURE — 1160F RVW MEDS BY RX/DR IN RCRD: CPT | Performed by: ORTHOPAEDIC SURGERY

## 2024-08-14 PROCEDURE — 99213 OFFICE O/P EST LOW 20 MIN: CPT | Performed by: ORTHOPAEDIC SURGERY

## 2024-08-14 NOTE — PROGRESS NOTES
"    [facility] Note        Subjective     CC: Follow-up (6 week follow up- left knee osteoarthritis )      HPI    Yanira Bernal is a 69 y.o. female.  The left knee is doing better after cortisone injection from July 3.  She has occasional giving way.  She does physical therapy downstairs  ROS:    Constiutional:Pt denies fever, chills, nausea, or vomiting.  MSK:as above        Objective      Past Medical History  Past Medical History:   Diagnosis Date    Abnormal ECG     R side bundle branch block    Anesthesia complication     \"felt like I couldn't breathe\"    Ankle sprain     Arthritis of back     Asthma 2012    Cancer 2005    Squamous cell skin cancer x3, basal cell x2    Diabetes mellitus 2020    GERD (gastroesophageal reflux disease)     Hyperlipidemia 2010    Hypertension 2002    Knee swelling     bilateral    Liver disease     QUIROS    Lumbosacral disc disease 1979    MRSA (methicillin resistant Staphylococcus aureus) 2018    Peripheral neuropathy 1991    Rotator cuff syndrome 2015    Sleep apnea     uses CPAP    Tear of meniscus of knee 2018    Left     Social History     Socioeconomic History    Marital status:    Tobacco Use    Smoking status: Former     Current packs/day: 0.00     Average packs/day: 1.6 packs/day for 12.5 years (20.0 ttl pk-yrs)     Types: Cigarettes     Start date: 1973     Quit date: 1983     Years since quittin.5     Passive exposure: Past    Smokeless tobacco: Never   Vaping Use    Vaping status: Never Used   Substance and Sexual Activity    Alcohol use: Yes     Alcohol/week: 8.0 standard drinks of alcohol     Types: 3 Glasses of wine, 5 Drinks containing 0.5 oz of alcohol per week     Comment: social    Drug use: Never    Sexual activity: Not Currently     Partners: Male     Birth control/protection: Post-menopausal          Physical Exam  /92   Ht 172.7 cm (67.99\")   Wt 103 kg (227 lb 12.8 oz)   BMI 34.65 " kg/m²     Body mass index is 34.65 kg/m².    Patient is well nourished and well developed.        Ortho Exam  No change    Imaging/Labs/EMG Reviewed and Interpreted:  Imaging Results (Last 24 Hours)       ** No results found for the last 24 hours. **              Assessment    Assessment:  1. Primary osteoarthritis of left knee        Plan:  Recommend over the counter anti-inflammatories for pain and/or swelling  She will come back as needed.  She may want another cortisone shot.  She can get those 3 times a year.  She will continue physical therapy and she has a brace and cane that she uses as needed      Carlos Crisostomo MD  08/14/24  10:26 EDT      Dictated Utilizing Dragon Dictation.

## 2024-08-14 NOTE — PROGRESS NOTES
Physical Therapy Daily Progress Note    [facility]  [address]  [place] [address]      Patient: Yanira Bernal   : 1955  Diagnosis/ICD-10 Code:  Chronic pain of both knees [M25.561, M25.562, G89.29]  Referring practitioner: Carlos Crisostomo MD  Date of Initial Visit: Type: THERAPY  Noted: 2024  Today's Date: 2024  Patient seen for 4 sessions           Subjective   Patient reports L knee is feeling better though her R knee has started to hurt some. She has walked up to 30 min recently without noticing knee or heel pain.    Objective          Active Range of Motion   Left Knee   Flexion: WFL  Extension: WFL    Right Knee   Flexion: WFL  Extension: WFL    Passive Range of Motion   Left Knee   Flexion: WFL  Extension: WFL    Right Knee   Flexion: WFL  Extension: WFL and with pain    Strength/Myotome Testing     Left Hip   Planes of Motion   Extension: 4-  Abduction: 4    Right Hip   Planes of Motion   Extension: 4  Abduction: 4    Left Knee   Flexion: 5  Extension: 5    Right Knee   Flexion: 5  Extension: 5      See Exercise, Manual, and Modality Logs for complete treatment.       Assessment/Plan  Patient demonstrating improved B knee ROM and strength, while reporting functional and symptomatic improvements with her knees and heels.  She has started swimming twice a week and doing other forms of exercise at the gym, as well, to address her knee pain and rehabilitate her lumbar spine following surgery.    We are addressing bilateral Achilles tendinopathy simultaneously.  Her conditions irritability has significantly improved since introducing heel lifts.  She was able to tolerate progression to eccentric loading today without increased pain.        Timed:  Manual Therapy:    0     mins  29729;  Therapeutic Exercise:    15     mins  03443;     Neuromuscular Miryam:   0    mins  12141;    Therapeutic Activity:     23     mins  87746;     Gait Training:       0     mins  17433;     Ultrasound:     0     mins  30642;    Electrical Stimulation:    0     mins  62792 ( );    Untimed:  Electrical Stimulation:    0     mins  33237 ( );  Mechanical Traction:    0     mins  60920;   Dry Needlin     mins     Timed Treatment:   38   mins   Total Treatment:     38   mins    Janes Kiser, PT  Physical Therapist

## 2024-08-16 ENCOUNTER — TREATMENT (OUTPATIENT)
Dept: PHYSICAL THERAPY | Facility: CLINIC | Age: 69
End: 2024-08-16
Payer: MEDICARE

## 2024-08-16 DIAGNOSIS — M25.561 CHRONIC PAIN OF BOTH KNEES: Primary | ICD-10-CM

## 2024-08-16 DIAGNOSIS — M25.562 CHRONIC PAIN OF BOTH KNEES: Primary | ICD-10-CM

## 2024-08-16 DIAGNOSIS — G89.29 CHRONIC PAIN OF BOTH KNEES: Primary | ICD-10-CM

## 2024-08-16 DIAGNOSIS — M25.571 ACUTE RIGHT ANKLE PAIN: ICD-10-CM

## 2024-08-16 NOTE — PROGRESS NOTES
Physical Therapy Daily Progress Note    [facility]  [address]  [place] [address]      Patient: Yanira Bernal   : 1955  Diagnosis/ICD-10 Code:  Chronic pain of both knees [M25.561, M25.562, G89.29]  Referring practitioner: Carlos Crisostomo MD  Date of Initial Visit: Type: THERAPY  Noted: 2024  Today's Date: 2024  Patient seen for 5 sessions           Subjective   Patient reports R heel hurt with new HEP but has calmed down since allowing it to rest.    Objective   See Exercise, Manual, and Modality Logs for complete treatment.       Assessment/Plan  Trialed Achilles eccentrics, with good tolerance for 1st set but increased R heel soreness on 2nd set. Advised patient to stop if soreness/pain increases with repetition.    Progressed lumbar stability training to standing which she tolerated well.        Timed:  Manual Therapy:    0     mins  13805;  Therapeutic Exercise:    13     mins  79206;     Neuromuscular Miryam:   20    mins  69544;    Therapeutic Activity:     5     mins  13015;     Gait Trainin     mins  68562;     Ultrasound:     0     mins  91905;    Electrical Stimulation:    0     mins  29860 ( );    Untimed:  Electrical Stimulation:    0     mins  69700 ( );  Mechanical Traction:    0     mins  86626;   Dry Needlin     mins     Timed Treatment:   38   mins   Total Treatment:     51   mins    Janes Kiser PT  Physical Therapist

## 2024-08-21 ENCOUNTER — TREATMENT (OUTPATIENT)
Dept: PHYSICAL THERAPY | Facility: CLINIC | Age: 69
End: 2024-08-21
Payer: MEDICARE

## 2024-08-21 DIAGNOSIS — G89.29 CHRONIC PAIN OF BOTH KNEES: Primary | ICD-10-CM

## 2024-08-21 DIAGNOSIS — M25.571 ACUTE RIGHT ANKLE PAIN: ICD-10-CM

## 2024-08-21 DIAGNOSIS — M25.561 CHRONIC PAIN OF BOTH KNEES: Primary | ICD-10-CM

## 2024-08-21 DIAGNOSIS — M25.562 CHRONIC PAIN OF BOTH KNEES: Primary | ICD-10-CM

## 2024-08-21 NOTE — PROGRESS NOTES
Re-Assessment / Re-Certification      [facility]  [address]  [place] [address]    Patient: Yanira Bernal   : 1955  Diagnosis/ICD-10 Code:  Chronic pain of both knees [M25.561, M25.562, G89.29]  Referring practitioner: Carlos Crisostomo MD  Date of Initial Visit: Type: THERAPY  Noted: 2024  Today's Date: 2024  Patient seen for 6 sessions      Subjective:     Subjective Questionnaire: LEFS: 38/80  Clinical Progress: improved  Home Program Compliance: Yes  Treatment has included: therapeutic exercise, neuromuscular re-education, manual therapy, and therapeutic activity    Subjective Evaluation    History of Present Illness  Mechanism of injury: CLOF: walking limited to 30 min, standing limited to 30 min    Subjective comment: Patient reports L knee pain only on stairs though her R heel hurt worse the day after last visit. She has completed HEP infrequently recently due to being busy.  Patient Occupation: retired RN Pain  Current pain ratin  At best pain ratin  Pain scale at highest: R heel: 5, L knee: 4.       Objective          Active Range of Motion   Left Knee   Flexion: WFL  Extension: WFL    Right Knee   Flexion: WFL  Extension: WFL    Passive Range of Motion   Left Knee   Flexion: WFL  Extension: WFL    Right Knee   Flexion: WFL  Extension: WFL and with pain    Strength/Myotome Testing     Left Hip   Planes of Motion   Extension: 4-  Abduction: 4    Right Hip   Planes of Motion   Extension: 4  Abduction: 4    Left Knee   Flexion: 5  Extension: 5    Right Knee   Flexion: 5  Extension: 5      Assessment & Plan       Assessment  Impairments: abnormal gait, abnormal or restricted ROM, activity intolerance, impaired balance, impaired physical strength and pain with function   Functional limitations: carrying objects, walking, uncomfortable because of pain, standing and stooping   Assessment details: Patient is a 69-year-old female who  presents with chronic, recurrent bilateral knee pain.  Symptoms recurred during rehab for her low back, during which she uses a recumbent bike.  Symptoms limit standing and walking.  Knee ROM is WNL, though PROM right knee extension and left knee flexion are painful.  Left medial knee structures are TTP, reproducing her main complaint of pain.  Right knee is somewhat weaker than left.  Gross hip weakness is present.    8/21- Patient reporting symptomatic and functional improvement related to her knees and heels.  The strength and range of motion have also improved.  Her Achilles tendinitis has responded well to use of heel lifts, though has been in straighter irritability when trialing eccentric loading.  Barriers to therapy: multiple body regions, chronicity, comorbidities  Prognosis: good    Goals  Plan Goals: Short term goals (4 weeks)  1. Patient to be compliant with initial HEP. Met   2. Patient to improve LEFS to greater than or equal to 30/80. Met     Long Term goals (8 weeks)  1. Patient to demonstrate pain free and normal strength with MMTs. In progress  2. Patient to ascend and descend eight 8 inch steps reciprocally with one handrail with minimal to no antalgia or difficulty.In progress  3. Patient to demonstrate independence with home exercise program. In progress  4. Patient to improve LEFS to greater than or equal to 40/80. In progress       Plan  Therapy options: will be seen for skilled therapy services  Planned modality interventions: thermotherapy (hydrocollator packs), TENS, cryotherapy and dry needling  Planned therapy interventions: manual therapy, neuromuscular re-education, soft tissue mobilization, strengthening, stretching, therapeutic activities, joint mobilization, home exercise program, functional ROM exercises, balance/weight-bearing training, abdominal trunk stabilization, postural training, motor coordination training, spinal/joint mobilization, ADL retraining, transfer training, body  mechanics training and gait training  Frequency: 2x week  Duration in weeks: 4  Treatment plan discussed with: patient      Progress toward previous goals: Partially Met        Timeframe: 1 month  Prognosis to achieve goals: good    PT Signature: Janes Kiser, PT  PT License [id number]    Based upon review of the patient's progress and continued therapy plan, it is my medical opinion that Yanira Bernal should continue physical therapy treatment at [facility]  [address] [place] [id number] [phone].    Signature: __________________________________  Carlos Crisostomo MD    Timed:  Manual Therapy:    0     mins  90930;  Therapeutic Exercise:    13     mins  76765;     Neuromuscular Miryam:    10    mins  22516;    Therapeutic Activity:     15     mins  14519;     Gait Trainin     mins  17120;     Ultrasound:     0     mins  64564;    Electrical Stimulation:    0     mins  52777 ( );    Untimed:  Electrical Stimulation:    0     mins  50344 ( );  Mechanical Traction:    0     mins  57036;   Dry Needlin     mins     Timed Treatment:   38   mins   Total Treatment:     44   mins

## 2024-08-23 ENCOUNTER — TREATMENT (OUTPATIENT)
Dept: PHYSICAL THERAPY | Facility: CLINIC | Age: 69
End: 2024-08-23
Payer: MEDICARE

## 2024-08-23 DIAGNOSIS — M25.561 CHRONIC PAIN OF BOTH KNEES: Primary | ICD-10-CM

## 2024-08-23 DIAGNOSIS — M25.571 ACUTE RIGHT ANKLE PAIN: ICD-10-CM

## 2024-08-23 DIAGNOSIS — M25.562 CHRONIC PAIN OF BOTH KNEES: Primary | ICD-10-CM

## 2024-08-23 DIAGNOSIS — G89.29 CHRONIC PAIN OF BOTH KNEES: Primary | ICD-10-CM

## 2024-08-23 NOTE — PROGRESS NOTES
Physical Therapy Daily Progress Note    [facility]  [address]  [place] [address]      Patient: Yanira Bernal   : 1955  Diagnosis/ICD-10 Code:  Chronic pain of both knees [M25.561, M25.562, G89.29]  Referring practitioner: Carlos Crisostomo MD  Date of Initial Visit: Type: THERAPY  Noted: 2024  Today's Date: 2024  Patient seen for 7 sessions           Subjective   Patient reports swimming yesterday without any issue, except right hip catching/pain.  Her muscles are very sore from swimming.  No increased right heel pain with modified prescription of eccentric heel raises.    Objective   See Exercise, Manual, and Modality Logs for complete treatment.       Assessment/Plan  Modified multifidus retraining away from prone due to patient c/o of nausea.  Significant improvement in tolerance when completed in plantigrade position.  Progressed posterior chain retraining in hook lying with fatigue but no increased pain.            Timed:  Manual Therapy:    0     mins  91312;  Therapeutic Exercise:    10     mins  82269;     Neuromuscular Miryam:   20    mins  39203;    Therapeutic Activity:     8     mins  48974;     Gait Trainin     mins  96605;     Ultrasound:     0     mins  52464;    Electrical Stimulation:    0     mins  81129 ( );    Untimed:  Electrical Stimulation:    0     mins  36499 ( );  Mechanical Traction:    0     mins  32819;   Dry Needlin     mins     Timed Treatment:   38   mins   Total Treatment:     38   mins    Janes Kiser PT  Physical Therapist

## 2024-08-26 ENCOUNTER — TREATMENT (OUTPATIENT)
Dept: PHYSICAL THERAPY | Facility: CLINIC | Age: 69
End: 2024-08-26
Payer: MEDICARE

## 2024-08-26 DIAGNOSIS — M25.561 CHRONIC PAIN OF BOTH KNEES: Primary | ICD-10-CM

## 2024-08-26 DIAGNOSIS — M25.562 CHRONIC PAIN OF BOTH KNEES: Primary | ICD-10-CM

## 2024-08-26 DIAGNOSIS — G89.29 CHRONIC PAIN OF BOTH KNEES: Primary | ICD-10-CM

## 2024-08-26 DIAGNOSIS — M25.571 ACUTE RIGHT ANKLE PAIN: ICD-10-CM

## 2024-08-26 NOTE — PROGRESS NOTES
Physical Therapy Daily Progress Note    [facility]  [address]  [place] [address]      Patient: Yanira Bernal   : 1955  Diagnosis/ICD-10 Code:  Chronic pain of both knees [M25.561, M25.562, G89.29]  Referring practitioner: Carlos Crisostomo MD  Date of Initial Visit: Type: THERAPY  Noted: 2024  Today's Date: 2024  Patient seen for 8 sessions           Subjective   Patient reports her R shoulder and heel hurt worse after last visit, but they are recovering. R hip has been hurting worse over the past week with weight bearing.    Objective   See Exercise, Manual, and Modality Logs for complete treatment.       Assessment/Plan  Modified multifidus retraining into a position of improved tolerance for her shoulders and heels.  Discontinued single-leg bridge due to excessive loading of right hip joint, replacing it with lower extremity strength exercise involving stability ball.    Patient seemed to tolerate modifications better, the right hip and heel were limiting factors today.  Patient advised on modifications for swimming to prevent symptom aggravation.        Timed:  Manual Therapy:    0     mins  25199;  Therapeutic Exercise:    20     mins  95257;     Neuromuscular Miryam:   10    mins  36797;    Therapeutic Activity:     8     mins  10730;     Gait Trainin     mins  44399;     Ultrasound:     0     mins  45437;    Electrical Stimulation:    0     mins  28557 ( );    Untimed:  Electrical Stimulation:    0     mins  35961 ( );  Mechanical Traction:    0     mins  99441;   Dry Needlin     mins     Timed Treatment:   38   mins   Total Treatment:     45   mins    Janes iKser PT  Physical Therapist

## 2024-08-28 ENCOUNTER — TELEPHONE (OUTPATIENT)
Dept: PHYSICAL THERAPY | Facility: OTHER | Age: 69
End: 2024-08-28
Payer: MEDICARE

## 2024-08-28 NOTE — TELEPHONE ENCOUNTER
Caller: Yanira Bernal    Relationship: Self    What was the call regarding: PATIENT CANCELLED APPT TODAY BECAUSE SHE IS NOT FEELING WELL

## 2024-09-04 ENCOUNTER — TREATMENT (OUTPATIENT)
Dept: PHYSICAL THERAPY | Facility: CLINIC | Age: 69
End: 2024-09-04
Payer: MEDICARE

## 2024-09-04 DIAGNOSIS — M25.562 CHRONIC PAIN OF BOTH KNEES: Primary | ICD-10-CM

## 2024-09-04 DIAGNOSIS — G89.29 CHRONIC PAIN OF BOTH KNEES: Primary | ICD-10-CM

## 2024-09-04 DIAGNOSIS — M25.571 ACUTE RIGHT ANKLE PAIN: ICD-10-CM

## 2024-09-04 DIAGNOSIS — M25.561 CHRONIC PAIN OF BOTH KNEES: Primary | ICD-10-CM

## 2024-09-04 NOTE — PROGRESS NOTES
Physical Therapy Daily Progress Note    [facility]  [address]  [place] [address]      Patient: Yanira Bernal   : 1955  Diagnosis/ICD-10 Code:  Chronic pain of both knees [M25.561, M25.562, G89.29]  Referring practitioner: Carlos Crisostomo MD  Date of Initial Visit: Type: THERAPY  Noted: 2024  Today's Date: 2024  Patient seen for 9 sessions           Subjective   Patient reports having flu-like symptoms until recently, so she has mainly rested since last visit. Overall, she is having less pain today.    Objective   See Exercise, Manual, and Modality Logs for complete treatment.       Assessment/Plan  Resumed patient's full routine without progression due to recent illness and lack of physical activity.  Foot intrinsic motor control training shortened due to patient demonstrating worse than normal balance compared to previous visits.    Intermittent sitting breaks provided due to fatigue.  No increased pain reported during or after treatment.      Timed:  Manual Therapy:    0     mins  77544;  Therapeutic Exercise:    23     mins  88888;     Neuromuscular Miryam:   11    mins  43069;    Therapeutic Activity:     10     mins  97594;     Gait Trainin     mins  23349;     Ultrasound:     0     mins  12287;    Electrical Stimulation:    0     mins  18086 ( );    Untimed:  Electrical Stimulation:    0     mins  53510 ( );  Mechanical Traction:    0     mins  64394;   Dry Needlin     mins     Timed Treatment:   44   mins   Total Treatment:     44   mins    Janes Kiser PT  Physical Therapist

## 2024-09-06 ENCOUNTER — TREATMENT (OUTPATIENT)
Dept: PHYSICAL THERAPY | Facility: CLINIC | Age: 69
End: 2024-09-06
Payer: MEDICARE

## 2024-09-06 DIAGNOSIS — M25.561 CHRONIC PAIN OF BOTH KNEES: Primary | ICD-10-CM

## 2024-09-06 DIAGNOSIS — M25.571 ACUTE RIGHT ANKLE PAIN: ICD-10-CM

## 2024-09-06 DIAGNOSIS — G89.29 CHRONIC PAIN OF BOTH KNEES: Primary | ICD-10-CM

## 2024-09-06 DIAGNOSIS — M25.562 CHRONIC PAIN OF BOTH KNEES: Primary | ICD-10-CM

## 2024-09-06 NOTE — PROGRESS NOTES
Physical Therapy Daily Progress Note    [facility]  [address]  [place] [address]      Patient: Yanira Bernal   : 1955  Diagnosis/ICD-10 Code:  Chronic pain of both knees [M25.561, M25.562, G89.29]  Referring practitioner: Carlos Crisostomo MD  Date of Initial Visit: Type: THERAPY  Noted: 2024  Today's Date: 2024  Patient seen for 10 sessions           Subjective   Patient reports feeling much better overall compared to previous visit.    Objective   See Exercise, Manual, and Modality Logs for complete treatment.       Assessment/Plan  Continues with much of existing routine, introducing static balance training without complaint of heel pain, though she reported increased right fifth digit pain and right single-leg stance.  This is a chronic issue which limits her activities, but she has yet to seek assessment by an orthopedist.  Patient advised that the issue is likely worthy of receiving orthopedic assessment.     With no increased heel pain the past 2 visits, eccentric loading will be advanced next visit.        Timed:  Manual Therapy:    0     mins  56334;  Therapeutic Exercise:    20     mins  57543;     Neuromuscular Miryam:   10    mins  30188;    Therapeutic Activity:     10     mins  20117;     Gait Trainin     mins  51430;     Ultrasound:     0     mins  24562;    Electrical Stimulation:    0     mins  25317 ( );    Untimed:  Electrical Stimulation:    0     mins  84467 ( );  Mechanical Traction:    0     mins  22026;   Dry Needlin     mins     Timed Treatment:   40   mins   Total Treatment:     40   mins    Janes Kiser PT  Physical Therapist

## 2024-09-09 ENCOUNTER — TELEPHONE (OUTPATIENT)
Dept: ORTHOPEDICS | Facility: OTHER | Age: 69
End: 2024-09-09
Payer: MEDICARE

## 2024-09-10 NOTE — PROGRESS NOTES
Patient: Yanira Bernal  : 1955  Chart #: [id number]    Date of Service: 2024    Chief Complaint: Low back and lower extremity pain with walking and standing intolerance; follow-up    HPI:  Ms. Bernal is a 69-year-old woman with progressive symptoms as noted above.  Given high-grade lumbar stenosis she underwent decompressive laminectomy at L4-5 on 2024.  Postoperatively she developed severe left lower extremity pain that responded to an increased dose of gabapentin.  For years she took gabapentin 600 mg daily.  That was increased to 1200 mg.  The leg pain is absent.  She has decreased her gabapentin back to her original 600 mg dosage at night.  Her back is doing better.  She was quite limited by a right Achilles problem and a bad left knee, and physical therapy was ordered for her knees. She has been participating with that. She has also been trying Robaxin in lieu of Flexeril.      Ms. Bernal has only noted a numbness and tingling in her legs that wakes her around 4 am nightly. This is something she experienced prior to surgery, and does not seem to be related to her spine surgery. Otherwise, she does not have complaints.     Review of Systems   Constitutional:  Negative for activity change, appetite change, chills, diaphoresis, fatigue, fever and unexpected weight change.   HENT:  Negative for congestion, dental problem, drooling, ear discharge, ear pain, facial swelling, hearing loss, mouth sores, nosebleeds, postnasal drip, rhinorrhea, sinus pressure, sinus pain, sneezing, sore throat, tinnitus, trouble swallowing and voice change.    Eyes:  Negative for photophobia, pain, discharge, redness, itching and visual disturbance.   Respiratory:  Negative for apnea, cough, choking, chest tightness, shortness of breath, wheezing and stridor.    Cardiovascular:  Negative for chest pain, palpitations and leg swelling.   Gastrointestinal:  Negative for abdominal distention, abdominal  "pain, anal bleeding, blood in stool, constipation, diarrhea, nausea, rectal pain and vomiting.   Endocrine: Negative for cold intolerance, heat intolerance, polydipsia, polyphagia and polyuria.   Genitourinary:  Negative for decreased urine volume, difficulty urinating, dyspareunia, dysuria, enuresis, flank pain, frequency, genital sores, hematuria, menstrual problem, pelvic pain, urgency, vaginal bleeding, vaginal discharge and vaginal pain.   Musculoskeletal:  Negative for arthralgias, back pain, gait problem, joint swelling, myalgias, neck pain and neck stiffness.   Skin:  Negative for color change, pallor, rash and wound.   Allergic/Immunologic: Negative for environmental allergies, food allergies and immunocompromised state.   Neurological:  Positive for numbness. Negative for dizziness, tremors, seizures, syncope, facial asymmetry, speech difficulty, weakness, light-headedness and headaches.   Hematological:  Negative for adenopathy. Does not bruise/bleed easily.   Psychiatric/Behavioral:  Negative for agitation, behavioral problems, confusion, decreased concentration, dysphoric mood, hallucinations, self-injury, sleep disturbance and suicidal ideas. The patient is not nervous/anxious and is not hyperactive.         The patient's past medical history, past surgical history, family history, and social history have been reviewed at length in the electronic medical record.     Physical examination:  Temperature 97.5 °F (36.4 °C), temperature source Infrared, height 172.7 cm (67.99\"), weight 106 kg (233 lb).    Constitutional: The patient is well-developed. She is overweight. She is in no acute distress.     HEENT: normocephalic, atraumatic, sclera clear    Musculoskeletal:  Motor examination does not reveal weakness in the , upper or lower extremities.   Tenderness is elicited to palpation in the midline lumbar spine and over bilateral trochanteric bursae.     Dermatological: Lumbar incision is " well-healed.    Neurologic Exam  A/A/C, speech clear, attention normal, conversant, answers questions appropriately, good historian.  Sensation is intact to light touch testing.  Gait is normal, balance is normal.   No tremors are noted.  Reflexes are intact.   Murray is negative. Clonus is negative.    Medical Decision Making  Diagnoses and all orders for this visit:    1. Lumbar stenosis with neurogenic claudication (Primary)    2. Numbness and tingling of both legs    Overall, Ms. Beranl's preoperative symptoms have improved.  She is pleased with her surgical outcome.  She does have some residual numbness in her legs at night that I do not feel to be related to her spine, but would like to continue to monitor.  We discussed tapering her gabapentin further to see if she can discontinue that.  She will follow-up in 3 months to assess progress.    Any copied data from previous notes included in the (1) HPI, (2) PE, (3) MDM and/or assessment and plan has been reviewed and is accurate as of this day.    Juany Bingham PA-C     Patient Care Team:  Leticia Menchaca MD as PCP - General (Psychiatry)  Leticia Menchaca MD as Primary Care Provider (Psychiatry)  Leticia Menchaca MD as Referring Physician (Psychiatry)  Carlos Crisostomo MD as Consulting Physician (Orthopedic Surgery)

## 2024-09-11 ENCOUNTER — OFFICE VISIT (OUTPATIENT)
Dept: NEUROSURGERY | Facility: CLINIC | Age: 69
End: 2024-09-11
Payer: MEDICARE

## 2024-09-11 VITALS — HEIGHT: 68 IN | BODY MASS INDEX: 35.31 KG/M2 | TEMPERATURE: 97.5 F | WEIGHT: 233 LBS

## 2024-09-11 DIAGNOSIS — R20.0 NUMBNESS AND TINGLING OF BOTH LEGS: ICD-10-CM

## 2024-09-11 DIAGNOSIS — M48.062 LUMBAR STENOSIS WITH NEUROGENIC CLAUDICATION: Primary | ICD-10-CM

## 2024-09-11 DIAGNOSIS — R20.2 NUMBNESS AND TINGLING OF BOTH LEGS: ICD-10-CM

## 2024-09-11 PROCEDURE — 99213 OFFICE O/P EST LOW 20 MIN: CPT

## 2024-09-12 ENCOUNTER — TREATMENT (OUTPATIENT)
Dept: PHYSICAL THERAPY | Facility: CLINIC | Age: 69
End: 2024-09-12
Payer: MEDICARE

## 2024-09-12 DIAGNOSIS — M25.561 CHRONIC PAIN OF BOTH KNEES: Primary | ICD-10-CM

## 2024-09-12 DIAGNOSIS — G89.29 CHRONIC PAIN OF BOTH KNEES: Primary | ICD-10-CM

## 2024-09-12 DIAGNOSIS — M25.562 CHRONIC PAIN OF BOTH KNEES: Primary | ICD-10-CM

## 2024-09-12 DIAGNOSIS — M25.571 ACUTE RIGHT ANKLE PAIN: ICD-10-CM

## 2024-09-12 NOTE — PROGRESS NOTES
Physical Therapy Daily Progress Note    [facility]  [address]  [place] [address]      Patient: Yanira Bernal   : 1955  Diagnosis/ICD-10 Code:  Chronic pain of both knees [M25.561, M25.562, G89.29]  Referring practitioner: Carlos Crisostomo MD  Date of Initial Visit: Type: THERAPY  Noted: 2024  Today's Date: 2024  Patient seen for 11 sessions           Subjective   Patient reports her R heel has started to hurt 2 days ago but she has been sick. Over the past 2-3 weeks, she has been awakened by B LE tingling to her feet that subsides when she starts moving.    Objective   See Exercise, Manual, and Modality Logs for complete treatment.       Assessment/Plan  Advanced eccentric loading of both Achilles with good tolerance due to patient report of no increased pain after the exercise at home.  Modified static balance from single-leg tandem stance to offload pressure on her right fifth digit, which she still reports to be very painful and plans to have assessed by orthopedics.            Timed:  Manual Therapy:    0     mins  72479;  Therapeutic Exercise:    13     mins  42760;     Neuromuscular Miryam:   10    mins  00067;    Therapeutic Activity:     0     mins  87298;     Gait Trainin     mins  76404;     Ultrasound:     0     mins  50598;    Electrical Stimulation:    0     mins  22948 ( );    Untimed:  Electrical Stimulation:    0     mins  73819 ( );  Mechanical Traction:    0     mins  46055;   Dry Needlin     mins   Re-evaluation                0      mins  07210;  Group Therapy              8      mins  35293;    Timed Treatment:   23   mins   Total Treatment:     45   mins    Janes Kiser PT  Physical Therapist

## 2024-09-16 ENCOUNTER — TREATMENT (OUTPATIENT)
Dept: PHYSICAL THERAPY | Facility: CLINIC | Age: 69
End: 2024-09-16
Payer: MEDICARE

## 2024-09-16 DIAGNOSIS — G89.29 CHRONIC PAIN OF BOTH KNEES: Primary | ICD-10-CM

## 2024-09-16 DIAGNOSIS — M25.562 CHRONIC PAIN OF BOTH KNEES: Primary | ICD-10-CM

## 2024-09-16 DIAGNOSIS — M25.571 ACUTE RIGHT ANKLE PAIN: ICD-10-CM

## 2024-09-16 DIAGNOSIS — M25.561 CHRONIC PAIN OF BOTH KNEES: Primary | ICD-10-CM

## 2024-09-16 PROCEDURE — 97530 THERAPEUTIC ACTIVITIES: CPT | Performed by: PHYSICAL THERAPIST

## 2024-09-16 PROCEDURE — 97112 NEUROMUSCULAR REEDUCATION: CPT | Performed by: PHYSICAL THERAPIST

## 2024-09-16 PROCEDURE — 97110 THERAPEUTIC EXERCISES: CPT | Performed by: PHYSICAL THERAPIST

## 2024-09-18 ENCOUNTER — TREATMENT (OUTPATIENT)
Dept: PHYSICAL THERAPY | Facility: CLINIC | Age: 69
End: 2024-09-18
Payer: MEDICARE

## 2024-09-18 DIAGNOSIS — M25.562 CHRONIC PAIN OF BOTH KNEES: Primary | ICD-10-CM

## 2024-09-18 DIAGNOSIS — M25.561 CHRONIC PAIN OF BOTH KNEES: Primary | ICD-10-CM

## 2024-09-18 DIAGNOSIS — G89.29 CHRONIC PAIN OF BOTH KNEES: Primary | ICD-10-CM

## 2024-09-18 DIAGNOSIS — M25.571 ACUTE RIGHT ANKLE PAIN: ICD-10-CM

## 2024-09-19 ENCOUNTER — OFFICE VISIT (OUTPATIENT)
Age: 69
End: 2024-09-19
Payer: MEDICARE

## 2024-09-19 VITALS
DIASTOLIC BLOOD PRESSURE: 96 MMHG | BODY MASS INDEX: 35.42 KG/M2 | WEIGHT: 233.69 LBS | HEIGHT: 68 IN | SYSTOLIC BLOOD PRESSURE: 162 MMHG

## 2024-09-19 DIAGNOSIS — M79.671 RIGHT FOOT PAIN: Primary | ICD-10-CM

## 2024-09-19 PROCEDURE — 1160F RVW MEDS BY RX/DR IN RCRD: CPT | Performed by: PHYSICIAN ASSISTANT

## 2024-09-19 PROCEDURE — 1159F MED LIST DOCD IN RCRD: CPT | Performed by: PHYSICIAN ASSISTANT

## 2024-09-19 PROCEDURE — 99213 OFFICE O/P EST LOW 20 MIN: CPT | Performed by: PHYSICIAN ASSISTANT

## 2024-09-26 ENCOUNTER — TREATMENT (OUTPATIENT)
Dept: PHYSICAL THERAPY | Facility: CLINIC | Age: 69
End: 2024-09-26
Payer: MEDICARE

## 2024-09-26 DIAGNOSIS — M25.571 ACUTE RIGHT ANKLE PAIN: ICD-10-CM

## 2024-09-26 DIAGNOSIS — M25.562 CHRONIC PAIN OF BOTH KNEES: Primary | ICD-10-CM

## 2024-09-26 DIAGNOSIS — G89.29 CHRONIC PAIN OF BOTH KNEES: Primary | ICD-10-CM

## 2024-09-26 DIAGNOSIS — M25.561 CHRONIC PAIN OF BOTH KNEES: Primary | ICD-10-CM

## 2024-12-10 ENCOUNTER — OFFICE VISIT (OUTPATIENT)
Dept: NEUROSURGERY | Facility: CLINIC | Age: 69
End: 2024-12-10
Payer: MEDICARE

## 2024-12-10 VITALS — BODY MASS INDEX: 35.36 KG/M2 | HEIGHT: 68 IN | TEMPERATURE: 97.1 F | WEIGHT: 233.3 LBS

## 2024-12-10 DIAGNOSIS — R20.0 NUMBNESS AND TINGLING OF BOTH LEGS: Primary | ICD-10-CM

## 2024-12-10 DIAGNOSIS — R20.2 NUMBNESS AND TINGLING OF BOTH LEGS: Primary | ICD-10-CM

## 2024-12-10 DIAGNOSIS — R10.9 FLANK PAIN: ICD-10-CM

## 2024-12-10 DIAGNOSIS — M48.062 LUMBAR STENOSIS WITH NEUROGENIC CLAUDICATION: ICD-10-CM

## 2024-12-10 PROCEDURE — 99213 OFFICE O/P EST LOW 20 MIN: CPT

## 2024-12-10 NOTE — PROGRESS NOTES
Patient: Yanira Bernal  : 1955  Chart #: [id number]    Date of Service: 12/10/2024    Chief Complaint: Tingling in BLE at night     History of Present Illness: Ms. Bernal is a 69-year-old woman that presented with low back and lower extremity pain with walking and standing tolerance.  Given high-grade lumbar stenosis she underwent decompressive laminectomy at L4-5 on 2024.  Postoperatively she developed severe left lower extremity pain that responded to an increased dose of gabapentin.  For years, she has taken gabapentin 600 mg daily.  That was increased to 1200 mg.  She reduced gabapentin back to her original 600 mg dosage at night after her leg pain subsided.  On 2024 (Dr. Figueroa), she had a right Achilles tendon repair and just got out of her boot a week ago.  Last week, she further reduced her gabapentin to only 300 mg at night.  She has noticed that she has some pain in her back that will radiate to the left flank.  She does not have new urinary symptoms, although she does notice that there has been a strange odor to her urine.     Review of Systems   Constitutional:  Negative for activity change, appetite change, chills, diaphoresis, fatigue, fever and unexpected weight change.   HENT:  Negative for congestion, dental problem, drooling, ear discharge, ear pain, facial swelling, hearing loss, mouth sores, nosebleeds, postnasal drip, rhinorrhea, sinus pressure, sinus pain, sneezing, sore throat, tinnitus, trouble swallowing and voice change.    Eyes:  Negative for photophobia, pain, discharge, redness, itching and visual disturbance.   Respiratory:  Negative for apnea, cough, choking, chest tightness, shortness of breath, wheezing and stridor.    Cardiovascular:  Negative for chest pain, palpitations and leg swelling.   Gastrointestinal:  Negative for abdominal distention, abdominal pain, anal bleeding, blood in stool, constipation, diarrhea, nausea, rectal pain and vomiting.  "  Endocrine: Negative for cold intolerance, heat intolerance, polydipsia, polyphagia and polyuria.   Genitourinary:  Negative for decreased urine volume, difficulty urinating, dyspareunia, dysuria, enuresis, flank pain, frequency, genital sores, hematuria, menstrual problem, pelvic pain, urgency, vaginal bleeding, vaginal discharge and vaginal pain.   Musculoskeletal:  Positive for back pain. Negative for arthralgias, gait problem, joint swelling, myalgias, neck pain and neck stiffness.   Skin:  Negative for color change, pallor, rash and wound.   Allergic/Immunologic: Negative for environmental allergies, food allergies and immunocompromised state.   Neurological:  Positive for numbness. Negative for dizziness, tremors, seizures, syncope, facial asymmetry, speech difficulty, weakness, light-headedness and headaches.   Hematological:  Negative for adenopathy. Does not bruise/bleed easily.   Psychiatric/Behavioral:  Negative for agitation, behavioral problems, confusion, decreased concentration, dysphoric mood, hallucinations, self-injury, sleep disturbance and suicidal ideas. The patient is not nervous/anxious and is not hyperactive.         The patient's past medical history, past surgical history, family history, and social history have been reviewed at length in the electronic medical record.    Physical examination:  Temperature 97.1 °F (36.2 °C), temperature source Infrared, height 172.7 cm (67.99\"), weight 106 kg (233 lb 4.8 oz).    Constitutional: the patient is well-developed, well-nourished. She is in no acute distress.    HEENT: normocephalic, atraumatic, sclera clear    Dermatological: Lumbar incision is well-healed.     MSK:   Straight leg testing is negative.  Aramis's signs are negative.      Neurological Exam   A/A/C, speech clear, attention normal, conversant, answers questions appropriately, good historian.  Motor examination does not reveal weakness in the , upper or lower extremities. "   Sensation is intact.  Gait is normal, balance is normal.   No tremors are noted.  Reflexes are difficult to elicit throughout.   Clonus is negative.       Medical Decision Making  Diagnoses and all orders for this visit:    1. Numbness and tingling of both legs (Primary)  -     Urinalysis With Culture If Indicated -; Future    2. Flank pain  -     Urinalysis With Culture If Indicated -; Future    3. Lumbar stenosis with neurogenic claudication    Ms. Ramos had some recurrence of low back pain after reducing her gabapentin to 300 mg daily, however this may be more related to her convalescence of her Achilles injury.  I have sent for a muscle relaxant to help her with this.  I am ordering a UA to rule out a urinary tract infection for her left flank pain as well.  I have advised her to follow-up with the provider that evaluated her for restless leg syndromes at the [facility] as they recommend a regimen of gabapentin to manage this.  She will follow-up with neurosurgery on an as-needed basis.  We reviewed new or worsening symptoms she should make her office aware of.    Any copied data from previous notes included in the (1) HPI, (2) PE, (3) MDM and/or assessment and plan has been reviewed and is accurate as of this day.    Juany Bingham PA-C     Patient Care Team:  Leticia Menchaca MD as PCP - General (Psychiatry)  Leticia Menchaca MD as Primary Care Provider (Psychiatry)  Leticia Menchaca MD as Referring Physician (Psychiatry)  Carlos Crisostomo MD as Consulting Physician (Orthopedic Surgery)  Celestina Fairchild PA-C as Physician Assistant (Physician Assistant)

## 2024-12-11 ENCOUNTER — LAB (OUTPATIENT)
Facility: HOSPITAL | Age: 69
End: 2024-12-11
Payer: MEDICARE

## 2024-12-11 DIAGNOSIS — R10.9 FLANK PAIN: ICD-10-CM

## 2024-12-11 DIAGNOSIS — R20.2 NUMBNESS AND TINGLING OF BOTH LEGS: ICD-10-CM

## 2024-12-11 DIAGNOSIS — R20.0 NUMBNESS AND TINGLING OF BOTH LEGS: ICD-10-CM

## 2024-12-11 PROCEDURE — 81003 URINALYSIS AUTO W/O SCOPE: CPT

## 2024-12-12 LAB
BILIRUB UR QL STRIP: NEGATIVE
CLARITY UR: CLEAR
COLOR UR: YELLOW
GLUCOSE UR STRIP-MCNC: NEGATIVE MG/DL
HGB UR QL STRIP.AUTO: NEGATIVE
HOLD SPECIMEN: NORMAL
KETONES UR QL STRIP: NEGATIVE
LEUKOCYTE ESTERASE UR QL STRIP.AUTO: NEGATIVE
NITRITE UR QL STRIP: NEGATIVE
PH UR STRIP.AUTO: 5.5 [PH] (ref 5–8)
PROT UR QL STRIP: NEGATIVE
SP GR UR STRIP: 1.01 (ref 1–1.03)
UROBILINOGEN UR QL STRIP: NORMAL

## 2025-01-13 ENCOUNTER — OFFICE VISIT (OUTPATIENT)
Dept: ORTHOPEDIC SURGERY | Facility: CLINIC | Age: 70
End: 2025-01-13
Payer: MEDICARE

## 2025-01-13 VITALS
BODY MASS INDEX: 35.31 KG/M2 | WEIGHT: 233 LBS | HEIGHT: 68 IN | SYSTOLIC BLOOD PRESSURE: 138 MMHG | DIASTOLIC BLOOD PRESSURE: 70 MMHG

## 2025-01-13 DIAGNOSIS — M79.671 RIGHT FOOT PAIN: Primary | ICD-10-CM

## 2025-01-13 PROCEDURE — 99214 OFFICE O/P EST MOD 30 MIN: CPT | Performed by: ORTHOPAEDIC SURGERY

## 2025-01-13 PROCEDURE — 1160F RVW MEDS BY RX/DR IN RCRD: CPT | Performed by: ORTHOPAEDIC SURGERY

## 2025-01-13 PROCEDURE — 20604 DRAIN/INJ JOINT/BURSA W/US: CPT | Performed by: ORTHOPAEDIC SURGERY

## 2025-01-13 PROCEDURE — 1159F MED LIST DOCD IN RCRD: CPT | Performed by: ORTHOPAEDIC SURGERY

## 2025-01-13 RX ORDER — TRIAMCINOLONE ACETONIDE 40 MG/ML
40 INJECTION, SUSPENSION INTRA-ARTICULAR; INTRAMUSCULAR
Status: COMPLETED | OUTPATIENT
Start: 2025-01-13 | End: 2025-01-13

## 2025-01-13 RX ORDER — ROPIVACAINE HYDROCHLORIDE 5 MG/ML
1 INJECTION, SOLUTION EPIDURAL; INFILTRATION; PERINEURAL
Status: COMPLETED | OUTPATIENT
Start: 2025-01-13 | End: 2025-01-13

## 2025-01-13 RX ADMIN — ROPIVACAINE HYDROCHLORIDE 1 ML: 5 INJECTION, SOLUTION EPIDURAL; INFILTRATION; PERINEURAL at 12:08

## 2025-01-13 RX ADMIN — TRIAMCINOLONE ACETONIDE 40 MG: 40 INJECTION, SUSPENSION INTRA-ARTICULAR; INTRAMUSCULAR at 12:08

## 2025-01-13 NOTE — PROGRESS NOTES
"                                      [facility] Office Visit     Office Visit       Date: 01/13/2025   Patient Name: Yanira Bernal  MRN: [id number]  YOB: 1955    Referring Physician: No ref. provider found     Chief Complaint:   Chief Complaint   Patient presents with    Right Foot - Pain       History of Present Illness: Yanira Bernal is a 69 y.o. female who is here today with chief complaint of right fifth toe pain.  States pain has been going on for about a year.  Pain worse with activity and better with rest.  Patient last seen by me July 2024 for insertional Achilles tendinitis for which she is currently in the middle of getting treatment for, Tenex by outside physician.  Here today can chief complaint of right fifth toe pain.  Previously seen by one of my partners.  States got laser therapy performed on toe by previously treating podiatrist which did not help with her symptoms.  Here to discuss further management options.  Of significance patient did have hammertoe correction of that toe in 2018.  Patient is a retired RN.  Denies smoking.    Subjective   Review of Systems: Review of Systems   Constitutional: Negative.    HENT: Negative.     Eyes: Negative.    Respiratory: Negative.     Cardiovascular: Negative.    Gastrointestinal: Negative.    Endocrine: Negative.    Genitourinary: Negative.    Musculoskeletal:  Positive for arthralgias.   Skin: Negative.    Allergic/Immunologic: Negative.    Neurological: Negative.    Hematological: Negative.    Psychiatric/Behavioral: Negative.          Past Medical History:   Past Medical History:   Diagnosis Date    Abnormal ECG 2012    R side bundle branch block    Anesthesia complication 1989    \"felt like I couldn't breathe\"    Ankle sprain 1999    Arthritis of back     Asthma 2012    Bursitis of hip 2023    Pain    Cancer 2005    Squamous cell skin cancer x3, basal cell x2    Diabetes mellitus 2020    GERD " "(gastroesophageal reflux disease)     Hip arthrosis 2023 2000    Hyperlipidemia 2010    Hypertension 2002    Knee swelling     bilateral    Liver disease 2020    QUIROS    Low back pain 1979    Lumbosacral disc disease 1979    MRSA (methicillin resistant Staphylococcus aureus) 2018    Peripheral neuropathy 1991    Rotator cuff syndrome 2015    Sleep apnea     uses CPAP    Tear of meniscus of knee 2018    Left       Past Surgical History:   Past Surgical History:   Procedure Laterality Date    ACHILLES TENDON SURGERY Right 11/04/2024    Dr. Figueroa    APPENDECTOMY      BACK SURGERY  2024    Lamenectomy    COLONOSCOPY      DIAGNOSTIC LAPAROSCOPY      x2    ENDOSCOPY      EPIDURAL BLOCK  [id number]    Childbirth    EYE SURGERY Left     strabismus    FOOT SURGERY  R baby toe- Hammer toe    HAND SURGERY  2/2024    Trigger finger R    KNEE SURGERY  R knee 1988&1989. L knee 2018    meniscus tear, arthroscopy    LAPAROSCOPIC CHOLECYSTECTOMY      LAPAROTOMY OVARIAN CYSTECTOMY  1982    LUMBAR LAMINECTOMY DISCECTOMY DECOMPRESSION N/A 04/25/2024    Procedure: LUMBAR LAMINECTOMY DISCECTOMY DECOMPRESSION POSTERIOR L4-5;  Surgeon: Dheeraj Wellington MD;  Location: [facility];  Service: Neurosurgery;  Laterality: N/A;    ROTATOR CUFF REPAIR Right     + 1 screw    SHOULDER SURGERY  2011&2015&2017    Left x2, Right x1    SINUS SURGERY      x2    SMALL INTESTINE SURGERY      \"just untwisted it, not a resection\"    THYROIDECTOMY, PARTIAL      TRIGGER POINT INJECTION Left 02/01/2024    L middle finger       Family History:   Family History   Problem Relation Age of Onset    Cancer Mother         Lung cancer   Age 59    Early death Mother         59 years old    Thyroid disease Mother     Cancer Father         Eosophigeal cancer  age 66    Diabetes Father         Type 2    Alcohol abuse Father     Hyperlipidemia Father     Cancer Sister         T cell Lymphoma age 59    Alcohol abuse Sister     Depression Sister     Thyroid disease Sister  "        Hashimoto’s Thyroiditis    Early death Sister         59 years old    Diabetes Brother         Type 2    Asthma Brother        Social History:   Social History     Socioeconomic History    Marital status:    Tobacco Use    Smoking status: Former     Current packs/day: 0.00     Average packs/day: 1.6 packs/day for 12.5 years (20.0 ttl pk-yrs)     Types: Cigarettes     Start date: 1973     Quit date: 1983     Years since quittin.0     Passive exposure: Past    Smokeless tobacco: Never   Vaping Use    Vaping status: Never Used   Substance and Sexual Activity    Alcohol use: Yes     Alcohol/week: 8.0 standard drinks of alcohol     Types: 3 Glasses of wine, 5 Drinks containing 0.5 oz of alcohol per week     Comment: social    Drug use: Never    Sexual activity: Not Currently     Partners: Male     Birth control/protection: Post-menopausal       Medications:   Current Outpatient Medications:     Advair -21 MCG/ACT inhaler, Inhale 2 puffs 2 (Two) Times a Day., Disp: , Rfl:     albuterol sulfate HFA (ProAir HFA) 108 (90 Base) MCG/ACT inhaler, Inhale 2 puffs Every 4 (Four) Hours As Needed for Wheezing or Shortness of Air., Disp: , Rfl:     clonazePAM (KlonoPIN) 1 MG tablet, Take 1 tablet by mouth At Night As Needed (insomnia)., Disp: , Rfl:     dilTIAZem CD (CARDIZEM CD) 240 MG 24 hr capsule, Take 1 capsule by mouth Daily., Disp: , Rfl:     Dupixent 300 MG/2ML solution pen-injector, Every 14 (Fourteen) Days., Disp: , Rfl:     EPINEPHrine (EPIPEN) 0.3 MG/0.3ML solution auto-injector injection, , Disp: , Rfl:     estradiol (ESTRACE) 0.1 MG/GM vaginal cream, Insert  into the vagina 2 (Two) Times a Week.  and , Disp: , Rfl:     ferrous sulfate 325 (65 FE) MG EC tablet, Take 1 tablet by mouth 3 (Three) Times a Week. M,W,F, Disp: , Rfl:     fluticasone (FLONASE) 50 MCG/ACT nasal spray, Administer 1 spray into the nostril(s) as directed by provider 2 (Two) Times a Day., Disp: , Rfl:      gabapentin (NEURONTIN) 600 MG tablet, Take 1 tablet by mouth 2 (Two) Times a Day., Disp: , Rfl:     ketoconazole (NIZORAL) 2 % cream, APPLY TOPICALLY TO THE AFFECTED AREA ON ABDOMEN TWICE DAILY AS NEEDED, Disp: , Rfl:     lansoprazole (PREVACID) 30 MG capsule, Take 1 capsule by mouth., Disp: , Rfl:     losartan (COZAAR) 100 MG tablet, Take 1 tablet by mouth Daily., Disp: , Rfl:     MAGNESIUM GLYCINATE PO, Take 200 mg by mouth Every Night., Disp: , Rfl:     metFORMIN ER (GLUCOPHAGE-XR) 500 MG 24 hr tablet, Take 3 tablets by mouth Daily., Disp: , Rfl:     methocarbamol (ROBAXIN) 750 MG tablet, Take 1 tablet by mouth 2 (Two) Times a Day As Needed for Muscle Spasms., Disp: 60 tablet, Rfl: 0    montelukast (SINGULAIR) 10 MG tablet, Take 1 tablet by mouth Every Night., Disp: , Rfl:     omeprazole (priLOSEC) 40 MG capsule, Take 1 capsule by mouth Daily., Disp: , Rfl:     ondansetron ODT (ZOFRAN-ODT) 4 MG disintegrating tablet, Take 1 tablet by mouth As Needed., Disp: , Rfl:     pravastatin (PRAVACHOL) 40 MG tablet, Take 1 tablet by mouth Daily., Disp: , Rfl:     Tiotropium Bromide Monohydrate (SPIRIVA RESPIMAT) 1.25 MCG/ACT aerosol solution inhaler, Inhale 2 puffs Daily., Disp: , Rfl:     VITAMIN E 400 UNIT capsule, Take 2 capsules by mouth Daily., Disp: , Rfl:     Allergies:   Allergies   Allergen Reactions    Amlodipine Swelling     Swelling in feet    Dyazide [Triamterene-Hctz] Other (See Comments)     Hypercalcemia--pt was in hospital for 5 days     Adhesive Tape Rash     Does okay with paper tape      Dulaglutide Nausea Only    Levofloxacin Other (See Comments)     Muscle/tendon pain    Achilles pain       I reviewed the patient's chief complaint, history of present illness, review of systems, past medical history, surgical history, family history, social history, medications and allergy list.     Objective    Vital Signs:   Vitals:    01/13/25 1103   BP: 138/70   Weight: 106 kg (233 lb)   Height: 172.7 cm  "(67.99\")     Body mass index is 35.44 kg/m².    Ortho Exam:  right LE Foot and Ankle Exam:   Plantigrade foot.   There is good perfusion to the toes.   The skin is intact throughout the foot and ankle.  Range of motion of ankle, foot and toes is within normal limits.   Dorsal incision fifth toe well-healed.  Toe slightly swollen compared to contralateral.  No erythema or signs of infection, no open wounds.  The toes diffusely tender.  There does not seem to be focal pain with DIP or PIP range of motion.  The clinical alignment of the toe is similar to contralateral.    Results Review:   Imaging Results (Last 24 Hours)       Procedure Component Value Units Date/Time    US Guided Injection [[id number]] Resulted: 01/13/25 1230     Updated: 01/13/25 1230    Narrative:      Ultrasound-guided corticosteroid injection of the right fifth toe PIP   joint              Assessment / Plan    Assessment/Plan:   Diagnoses and all orders for this visit:    1. Right foot pain (Primary)  -     US Guided Injection    Other orders  -     - Small Joint Arthrocentesis: R small PIP      Discussed chronic right fifth toe pain which has been present for over a year causing pain that has progressed (a chronic illness with exacerbation). Decision regarding surgical intervention considered. Patient is not a candidate due to trial of nonoperative management.  Discussed with patient that her alignment appears normal.  I had a long discussion with patient with regard to the etiology of her pain.  Does not seem to be mechanical in nature and I told patient I do not think that a surgery to change the alignment of the toe would help with her symptoms.  There is question that if she has developed a painful nonunion at her previous PIP resection site however moving the digit at this location does not seem to focally make her symptoms worsen.  Seems as though her pain is more diffuse and possibly neurologic in nature.  As such we discussed her " conservative treatment options and concluded that trying a steroid injection in the area of her previous PIP fusion site could be helpful from both a diagnostic and therapeutic standpoint.  A steroid injection was performed in the clinic today.  Patient to see how her toe response following injection and will plan to return to see me on an as-needed basis.  It was a pleasure seeing her today.    I discussed with the patient the potential benefits of performing a therapeutic injections as well as potential risks including but not limited to infection, swelling, pain, bleeding, bruising, nerve/vessel damage, skin color changes, transient elevation in blood glucose levels, and fat atrophy. After informed consent  a steroid injection was given, see separate procedure note.     Follow Up:   Return if symptoms worsen or fail to improve.      Reji Stover MD  [facility]

## 2025-01-13 NOTE — PROGRESS NOTES
Procedure   - Small Joint Arthrocentesis: R small PIP on 1/13/2025 12:08 PM  Indications: pain  Details: (23G) needle, ultrasound-guided dorsal approach  Medications: 1 mL ropivacaine 0.5 %; 40 mg triamcinolone acetonide 40 MG/ML  Outcome: tolerated well, no immediate complications  Procedure, treatment alternatives, risks and benefits explained, specific risks discussed. Consent was given by the patient. Immediately prior to procedure a time out was called to verify the correct patient, procedure, equipment, support staff and site/side marked as required. Patient was prepped and draped in the usual sterile fashion.

## 2025-08-13 ENCOUNTER — OFFICE VISIT (OUTPATIENT)
Age: 70
End: 2025-08-13
Payer: MEDICARE

## 2025-08-13 DIAGNOSIS — R31.29 MICROSCOPIC HEMATURIA: Primary | ICD-10-CM

## 2025-08-13 DIAGNOSIS — N95.2 ATROPHIC VAGINITIS: ICD-10-CM

## 2025-08-13 DIAGNOSIS — N39.0 RECURRENT UTI: ICD-10-CM

## 2025-08-13 DIAGNOSIS — R30.0 DYSURIA: ICD-10-CM

## 2025-08-13 LAB
BILIRUB BLD-MCNC: ABNORMAL MG/DL
CLARITY, POC: CLEAR
COLOR UR: YELLOW
EXPIRATION DATE: ABNORMAL
GLUCOSE UR STRIP-MCNC: NEGATIVE MG/DL
KETONES UR QL: NEGATIVE
LEUKOCYTE EST, POC: NEGATIVE
Lab: ABNORMAL
NITRITE UR-MCNC: NEGATIVE MG/ML
PH UR: 6 [PH] (ref 5–8)
PROT UR STRIP-MCNC: NEGATIVE MG/DL
RBC # UR STRIP: NEGATIVE /UL
SP GR UR: 1.01 (ref 1–1.03)
UROBILINOGEN UR QL: ABNORMAL

## 2025-08-13 RX ORDER — HYDROCODONE BITARTRATE AND ACETAMINOPHEN 7.5; 325 MG/1; MG/1
1 TABLET ORAL AS NEEDED
COMMUNITY

## 2025-08-13 RX ORDER — ESTRADIOL 0.1 MG/G
2 CREAM VAGINAL 2 TIMES WEEKLY
Qty: 42.5 G | Refills: 1 | Status: SHIPPED | OUTPATIENT
Start: 2025-08-14

## 2025-08-13 RX ORDER — PHENAZOPYRIDINE HYDROCHLORIDE 200 MG/1
200 TABLET, FILM COATED ORAL AS NEEDED
Qty: 20 TABLET | Refills: 1 | Status: SHIPPED | OUTPATIENT
Start: 2025-08-13

## 2025-08-15 ENCOUNTER — PATIENT ROUNDING (BHMG ONLY) (OUTPATIENT)
Age: 70
End: 2025-08-15
Payer: MEDICARE

## (undated) DEVICE — PATIENT RETURN ELECTRODE, SINGLE-USE, CONTACT QUALITY MONITORING, ADULT, WITH 9FT CORD, FOR PATIENTS WEIGING OVER 33LBS. (15KG): Brand: MEGADYNE

## (undated) DEVICE — C-ARM DRAPE: Brand: DEROYAL

## (undated) DEVICE — GLV SURG PREMIERPRO MIC LTX PF SZ7.5 BRN

## (undated) DEVICE — SUT SILK 2/0 PS 18IN 1588H

## (undated) DEVICE — HDRST INTUB GENTLETOUCH SLOT 7IN RT

## (undated) DEVICE — ANTIBACTERIAL UNDYED BRAIDED (POLYGLACTIN 910), SYNTHETIC ABSORBABLE SUTURE: Brand: COATED VICRYL

## (undated) DEVICE — SUT VIC PLS CTD ANTIB BR 3/0 8/18IN 45CM

## (undated) DEVICE — GLV SURG PREMIERPRO MIC LTX PF SZ8 BRN

## (undated) DEVICE — TOOL MR8-14MH30 MR8 14CM MATCH 3MM: Brand: MIDAS REX MR8

## (undated) DEVICE — CONN TBG Y 5 IN 1 LF STRL

## (undated) DEVICE — PK NEURO DISC 10

## (undated) DEVICE — STRAP POSTN KN/BDY FM 5X72IN DISP

## (undated) DEVICE — SYR LUERLOK 30CC

## (undated) DEVICE — ADHS SKIN PREMIERPRO EXOFIN TOPICAL HI/VISC .5ML

## (undated) DEVICE — BLANKT WARM UPPR/BDY ARM/OUT 57X196CM

## (undated) DEVICE — DRSNG WND BORDR/ADHS NONADHR/GZ LF 4X4IN STRL

## (undated) DEVICE — SPK TRANSFR TRANSOFIX DBL STRL

## (undated) DEVICE — DISPOSABLE IRRIGATION BIPOLAR CORD, M1000 TYPE: Brand: KIRWAN